# Patient Record
Sex: MALE | URBAN - METROPOLITAN AREA
[De-identification: names, ages, dates, MRNs, and addresses within clinical notes are randomized per-mention and may not be internally consistent; named-entity substitution may affect disease eponyms.]

---

## 2019-09-29 ENCOUNTER — INPATIENT (INPATIENT)
Facility: HOSPITAL | Age: 54
LOS: 2 days | Discharge: HOME | End: 2019-10-02
Attending: HOSPITALIST | Admitting: HOSPITALIST
Payer: SUBSIDIZED

## 2019-09-29 VITALS
RESPIRATION RATE: 18 BRPM | HEART RATE: 120 BPM | TEMPERATURE: 97 F | OXYGEN SATURATION: 99 % | SYSTOLIC BLOOD PRESSURE: 160 MMHG | DIASTOLIC BLOOD PRESSURE: 116 MMHG

## 2019-09-29 DIAGNOSIS — Z98.890 OTHER SPECIFIED POSTPROCEDURAL STATES: Chronic | ICD-10-CM

## 2019-09-29 LAB
ALBUMIN SERPL ELPH-MCNC: 4.1 G/DL — SIGNIFICANT CHANGE UP (ref 3.5–5.2)
ALP SERPL-CCNC: 78 U/L — SIGNIFICANT CHANGE UP (ref 30–115)
ALT FLD-CCNC: 37 U/L — SIGNIFICANT CHANGE UP (ref 0–41)
ANION GAP SERPL CALC-SCNC: 16 MMOL/L — HIGH (ref 7–14)
AST SERPL-CCNC: 25 U/L — SIGNIFICANT CHANGE UP (ref 0–41)
BILIRUB SERPL-MCNC: 0.6 MG/DL — SIGNIFICANT CHANGE UP (ref 0.2–1.2)
BUN SERPL-MCNC: 16 MG/DL — SIGNIFICANT CHANGE UP (ref 10–20)
CALCIUM SERPL-MCNC: 9.5 MG/DL — SIGNIFICANT CHANGE UP (ref 8.5–10.1)
CHLORIDE SERPL-SCNC: 104 MMOL/L — SIGNIFICANT CHANGE UP (ref 98–110)
CK SERPL-CCNC: 127 U/L — SIGNIFICANT CHANGE UP (ref 0–225)
CO2 SERPL-SCNC: 16 MMOL/L — LOW (ref 17–32)
CREAT SERPL-MCNC: 1.1 MG/DL — SIGNIFICANT CHANGE UP (ref 0.7–1.5)
GLUCOSE SERPL-MCNC: 190 MG/DL — HIGH (ref 70–99)
HCT VFR BLD CALC: 47.9 % — SIGNIFICANT CHANGE UP (ref 42–52)
HGB BLD-MCNC: 16.3 G/DL — SIGNIFICANT CHANGE UP (ref 14–18)
MCHC RBC-ENTMCNC: 28.4 PG — SIGNIFICANT CHANGE UP (ref 27–31)
MCHC RBC-ENTMCNC: 34 G/DL — SIGNIFICANT CHANGE UP (ref 32–37)
MCV RBC AUTO: 83.4 FL — SIGNIFICANT CHANGE UP (ref 80–94)
NRBC # BLD: 0 /100 WBCS — SIGNIFICANT CHANGE UP (ref 0–0)
PLATELET # BLD AUTO: 318 K/UL — SIGNIFICANT CHANGE UP (ref 130–400)
POTASSIUM SERPL-MCNC: 4.4 MMOL/L — SIGNIFICANT CHANGE UP (ref 3.5–5)
POTASSIUM SERPL-SCNC: 4.4 MMOL/L — SIGNIFICANT CHANGE UP (ref 3.5–5)
PROT SERPL-MCNC: 7.3 G/DL — SIGNIFICANT CHANGE UP (ref 6–8)
RBC # BLD: 5.74 M/UL — SIGNIFICANT CHANGE UP (ref 4.7–6.1)
RBC # FLD: 13.2 % — SIGNIFICANT CHANGE UP (ref 11.5–14.5)
SODIUM SERPL-SCNC: 136 MMOL/L — SIGNIFICANT CHANGE UP (ref 135–146)
TROPONIN T SERPL-MCNC: <0.01 NG/ML — SIGNIFICANT CHANGE UP
TROPONIN T SERPL-MCNC: <0.01 NG/ML — SIGNIFICANT CHANGE UP
WBC # BLD: 10.37 K/UL — SIGNIFICANT CHANGE UP (ref 4.8–10.8)
WBC # FLD AUTO: 10.37 K/UL — SIGNIFICANT CHANGE UP (ref 4.8–10.8)

## 2019-09-29 PROCEDURE — 93010 ELECTROCARDIOGRAM REPORT: CPT | Mod: 76

## 2019-09-29 PROCEDURE — 71046 X-RAY EXAM CHEST 2 VIEWS: CPT | Mod: 26

## 2019-09-29 PROCEDURE — 99220: CPT

## 2019-09-29 RX ORDER — SODIUM CHLORIDE 9 MG/ML
1000 INJECTION, SOLUTION INTRAVENOUS ONCE
Refills: 0 | Status: COMPLETED | OUTPATIENT
Start: 2019-09-29 | End: 2019-09-29

## 2019-09-29 RX ORDER — AMLODIPINE BESYLATE 2.5 MG/1
5 TABLET ORAL ONCE
Refills: 0 | Status: COMPLETED | OUTPATIENT
Start: 2019-09-29 | End: 2019-09-29

## 2019-09-29 RX ORDER — LISINOPRIL 2.5 MG/1
20 TABLET ORAL ONCE
Refills: 0 | Status: COMPLETED | OUTPATIENT
Start: 2019-09-29 | End: 2019-09-29

## 2019-09-29 RX ORDER — FAMOTIDINE 10 MG/ML
20 INJECTION INTRAVENOUS ONCE
Refills: 0 | Status: COMPLETED | OUTPATIENT
Start: 2019-09-29 | End: 2019-09-29

## 2019-09-29 RX ADMIN — Medication 30 MILLILITER(S): at 09:13

## 2019-09-29 RX ADMIN — LISINOPRIL 20 MILLIGRAM(S): 2.5 TABLET ORAL at 18:55

## 2019-09-29 RX ADMIN — AMLODIPINE BESYLATE 5 MILLIGRAM(S): 2.5 TABLET ORAL at 18:54

## 2019-09-29 RX ADMIN — SODIUM CHLORIDE 1000 MILLILITER(S): 9 INJECTION, SOLUTION INTRAVENOUS at 09:04

## 2019-09-29 RX ADMIN — FAMOTIDINE 20 MILLIGRAM(S): 10 INJECTION INTRAVENOUS at 09:14

## 2019-09-29 RX ADMIN — FAMOTIDINE 104 MILLIGRAM(S): 10 INJECTION INTRAVENOUS at 09:04

## 2019-09-29 NOTE — ED PROVIDER NOTE - PHYSICAL EXAMINATION
Vital Signs: I have reviewed the initial vital signs.  Constitutional: well-nourished, appears stated age, no acute distress  Cardiovascular: tachycardic, regular rhythm, well-perfused extremities  Respiratory: unlabored respiratory effort, clear to auscultation bilaterally  Gastrointestinal: soft, non-tender abdomen, no pulsatile mass  Musculoskeletal: supple neck, no lower extremity edema  Integumentary: warm, dry, no rash  Neurologic: awake, alert, cranial nerves II-XII grossly intact, extremities’ motor and sensory functions grossly intact. No dysmetria/dysdiadochokinesia   Psychiatric: appropriate mood, appropriate affect

## 2019-09-29 NOTE — ED ADULT NURSE NOTE - NSIMPLEMENTINTERV_GEN_ALL_ED
Implemented All Universal Safety Interventions:  [place] to call system. Call bell, personal items and telephone within reach. Instruct patient to call for assistance. Room bathroom lighting operational. Non-slip footwear when patient is off stretcher. Physically safe environment: no spills, clutter or unnecessary equipment. Stretcher in lowest position, wheels locked, appropriate side rails in place.

## 2019-09-29 NOTE — ED ADULT NURSE REASSESSMENT NOTE - NS ED NURSE REASSESS COMMENT FT1
pt. c'o left sided chest pain since yesterday non-radiating. pt. alert and oriented times four. VSS. pt. placed on cardiac monitor NSR. Lab drawn and sent. pt. denies any cp at present time. Comfort and safety measures in place, awaiting eval.

## 2019-09-29 NOTE — ED CDU PROVIDER INITIAL DAY NOTE - PROGRESS NOTE DETAILS
patient lying comfortable will continue to monitor bp elevated , no headache, no dizziness, no blurry vision, states has been non-compliant with medication, spoke to patient pmd dr. Waterman [phone], state patient on lisinipril 20mg and amlodipine 5 mg. will continue to monitor

## 2019-09-29 NOTE — ED CDU PROVIDER INITIAL DAY NOTE - NSCAREINITIATED _GEN_ER
----- Message from Betty Cox PA-C sent at 3/29/2019 11:52 AM CDT -----  Please inform of normal hepatic panel and normal repeat GGT. I don't feel further follow up needed at this time.  
Left message with now normal hepatic panel, per JESSICA Cox PA-C.  Informed her further work up is not needed at this time.  If she has questions, to call us back.  
Jordan Horton(Resident)

## 2019-09-29 NOTE — ED PROVIDER NOTE - NS ED ROS FT
Constitutional: (+) fever (-) vomiting  Eyes/ENT: (-) vision chanages, (-) hearing chanages  Cardiovascular: (+) chest pain, (-) sob  Respiratory: (+) cough, (-) shortness of breath  Gastrointestinal: (-) vomiting, (-) diarrhea, (-) abdominal pain  : (-) dysuria   Musculoskeletal: (-) back pain  Integumentary: (-) rash, (-) edema  Neurological: (-)loc  Allergic/Immunologic: (-) pruritus  Endocrine: No history of thyroid disease

## 2019-09-29 NOTE — ED PROVIDER NOTE - OBJECTIVE STATEMENT
53 y/o M pmh significant for htn, pre-diabetes, hiatal hernia, gout, presenting w/ intermittent burning left parasternal cp. Pt states at 1AM he experienced palpitations that shortly went away, followed by this cp at 2 am. Symptoms go back to Sept. 16th where he began feeling flu-like w/ subjective fevers, fatigue, nausea, and decreased appetite. Pt states at this time he also began experiencing in lisa. feet/hands. Denies vomiting, constipation, diarrhea, loc, recent travel, sick contacts.

## 2019-09-29 NOTE — ED ADULT NURSE REASSESSMENT NOTE - NS ED NURSE REASSESS COMMENT FT1
pt is aaox3, nad, respirations easy and regular, NSR on cardiac monitor, pt is comfortable resting, no complaints offered at this time

## 2019-09-29 NOTE — ED PROVIDER NOTE - PMH
Chronic gout of foot, unspecified cause, unspecified laterality    Essential hypertension    Hiatal hernia    Prediabetes

## 2019-09-29 NOTE — ED PROVIDER NOTE - PROGRESS NOTE DETAILS
ATTENDING NOTE: I personally evaluated the patient. I reviewed the Resident’s note (as assigned above), and agree with the findings and plan except as documented in my note.   53 y/o M with PMH of pre-DM and HTN now p/w CP since 1AM this morning associated with diaphoresis. No vomiting or radiation. Pt states he has not been feeling well since 9/16; has not followed up with his PMD Dr. Waterman. Last cardiac testing done in 2004.   Physical exam: Pt non-toxic, anxious, tachycardic. Pink conjunctiva, anicteric. PERRL, EOMI. MMM. RRR, no murmur. Lungs CTAB. Abdomen soft, NT/ND, no rash. No calf tenderness or edema. No focal neuro deficits.  Plan for labs, imaging, reassess.

## 2019-09-29 NOTE — ED PROVIDER NOTE - NS_EDPROVIDERDISPOUSERTYPE_ED_A_ED
Medical Students Attestation (For Medical Student USE Only)... Medical Students Attestation (For Medical Student USE Only).../Scribe Attestation (For Scribes USE Only)... Scribe Attestation (For Scribes USE Only).../Attending Attestation (For Attendings USE Only).../Medical Students Attestation (For Medical Student USE Only)...

## 2019-09-29 NOTE — ED CDU PROVIDER INITIAL DAY NOTE - ATTENDING CONTRIBUTION TO CARE
Pt reports he has been sick for 2 weeks. + cough, generalized weakness, and feels weak when walking. Yesterday noted chest pain. On exam S1S2 rrr, lungs clear, abdomen is soft nontender, ext neg for edema and tenderness. NO rash. -tob. Placed into observation for ACS evaluation. Will CK for body. Pt reports he has been sick for 2 weeks. + cough, generalized weakness, and feels weak when walking. Yesterday noted chest pain. On exam S1S2 rrr, lungs clear, abdomen is soft nontender, ext neg for edema and tenderness. NO rash. -tob. Placed into observation for ACS evaluation. Will CK for body..

## 2019-09-29 NOTE — ED PROVIDER NOTE - ALCOHOL USE HISTORY SINGLE SELECT
S: patient reports he has dribbling of urine, burning urination, pain on the suprapubic area/penis, increased frequency of urination. He denies any fever, chills, flank pain, nausea, vomiting, lightheadedness or dizziness  He is worried about stone.     Per chart review he has hx of urethral stricture s/p s/p dorsal onlay with recurrence s/p buccal graft ventral onlay urethroplasty in 2016    Objective: Vital signs:    Blood pressure 117/74, pulse 86, temperature 97.7  F (36.5  C), temperature source Oral, resp. rate 16, height 1.829 m (6'), weight 108.2 kg (238 lb 8 oz), SpO2 96 %.  Estimated body mass index is 32.35 kg/m  as calculated from the following:    Height as of this encounter: 1.829 m (6').    Weight as of this encounter: 108.2 kg (238 lb 8 oz).      Alert, awake,and oriented  S1, S2 normal  B/L CTA  Soft, NT  In no acute distress    A/P     Lower urinary tract symptoms  Hx of Urethral Stricture s/p Urethroplasty  He was able to urinate during my visit  He is no obvious distress.   I will order UA, and Renal US for evaluation  Follow up with Urology in AM   Sign out to medical team tomorrow     Addendum: UA with pyuria, no urgent indication to start Abx. Wait for culture    Ector Stephens  Internal Medicine  Hospitalist  Pager no: [phone]    yes...

## 2019-09-30 PROCEDURE — 99222 1ST HOSP IP/OBS MODERATE 55: CPT

## 2019-09-30 PROCEDURE — 99217: CPT

## 2019-09-30 PROCEDURE — 75574 CT ANGIO HRT W/3D IMAGE: CPT | Mod: 26

## 2019-09-30 RX ORDER — METOPROLOL TARTRATE 50 MG
50 TABLET ORAL
Refills: 0 | Status: DISCONTINUED | OUTPATIENT
Start: 2019-09-30 | End: 2019-10-02

## 2019-09-30 RX ORDER — ATORVASTATIN CALCIUM 80 MG/1
80 TABLET, FILM COATED ORAL AT BEDTIME
Refills: 0 | Status: DISCONTINUED | OUTPATIENT
Start: 2019-09-30 | End: 2019-10-02

## 2019-09-30 RX ORDER — AMLODIPINE BESYLATE 2.5 MG/1
5 TABLET ORAL DAILY
Refills: 0 | Status: DISCONTINUED | OUTPATIENT
Start: 2019-09-30 | End: 2019-10-01

## 2019-09-30 RX ORDER — HEPARIN SODIUM 5000 [USP'U]/ML
5000 INJECTION INTRAVENOUS; SUBCUTANEOUS EVERY 8 HOURS
Refills: 0 | Status: DISCONTINUED | OUTPATIENT
Start: 2019-09-30 | End: 2019-10-02

## 2019-09-30 RX ORDER — METOPROLOL TARTRATE 50 MG
100 TABLET ORAL ONCE
Refills: 0 | Status: COMPLETED | OUTPATIENT
Start: 2019-09-30 | End: 2019-09-30

## 2019-09-30 RX ORDER — CLOPIDOGREL BISULFATE 75 MG/1
300 TABLET, FILM COATED ORAL ONCE
Refills: 0 | Status: COMPLETED | OUTPATIENT
Start: 2019-09-30 | End: 2019-09-30

## 2019-09-30 RX ORDER — METOPROLOL TARTRATE 50 MG
50 TABLET ORAL ONCE
Refills: 0 | Status: COMPLETED | OUTPATIENT
Start: 2019-09-30 | End: 2019-09-30

## 2019-09-30 RX ORDER — METOPROLOL TARTRATE 50 MG
5 TABLET ORAL ONCE
Refills: 0 | Status: COMPLETED | OUTPATIENT
Start: 2019-09-30 | End: 2019-09-30

## 2019-09-30 RX ORDER — NITROGLYCERIN 6.5 MG
0.4 CAPSULE, EXTENDED RELEASE ORAL
Refills: 0 | Status: DISCONTINUED | OUTPATIENT
Start: 2019-09-30 | End: 2019-10-02

## 2019-09-30 RX ORDER — ALLOPURINOL 300 MG
100 TABLET ORAL DAILY
Refills: 0 | Status: DISCONTINUED | OUTPATIENT
Start: 2019-09-30 | End: 2019-10-02

## 2019-09-30 RX ORDER — INFLUENZA VIRUS VACCINE 15; 15; 15; 15 UG/.5ML; UG/.5ML; UG/.5ML; UG/.5ML
0.5 SUSPENSION INTRAMUSCULAR ONCE
Refills: 0 | Status: DISCONTINUED | OUTPATIENT
Start: 2019-09-30 | End: 2019-10-02

## 2019-09-30 RX ORDER — ASPIRIN/CALCIUM CARB/MAGNESIUM 324 MG
81 TABLET ORAL DAILY
Refills: 0 | Status: DISCONTINUED | OUTPATIENT
Start: 2019-09-30 | End: 2019-10-02

## 2019-09-30 RX ORDER — ENOXAPARIN SODIUM 100 MG/ML
114 INJECTION SUBCUTANEOUS ONCE
Refills: 0 | Status: COMPLETED | OUTPATIENT
Start: 2019-09-30 | End: 2019-09-30

## 2019-09-30 RX ORDER — ALLOPURINOL 300 MG
100 TABLET ORAL
Refills: 0 | Status: DISCONTINUED | OUTPATIENT
Start: 2019-09-30 | End: 2019-09-30

## 2019-09-30 RX ORDER — PANTOPRAZOLE SODIUM 20 MG/1
40 TABLET, DELAYED RELEASE ORAL
Refills: 0 | Status: DISCONTINUED | OUTPATIENT
Start: 2019-09-30 | End: 2019-10-02

## 2019-09-30 RX ORDER — SODIUM CHLORIDE 9 MG/ML
1000 INJECTION, SOLUTION INTRAVENOUS
Refills: 0 | Status: DISCONTINUED | OUTPATIENT
Start: 2019-09-30 | End: 2019-10-02

## 2019-09-30 RX ADMIN — SODIUM CHLORIDE 75 MILLILITER(S): 9 INJECTION, SOLUTION INTRAVENOUS at 20:58

## 2019-09-30 RX ADMIN — Medication 50 MILLIGRAM(S): at 09:24

## 2019-09-30 RX ADMIN — ATORVASTATIN CALCIUM 80 MILLIGRAM(S): 80 TABLET, FILM COATED ORAL at 22:56

## 2019-09-30 RX ADMIN — Medication 81 MILLIGRAM(S): at 15:45

## 2019-09-30 RX ADMIN — Medication 5 MILLIGRAM(S): at 15:46

## 2019-09-30 RX ADMIN — Medication 100 MILLIGRAM(S): at 08:55

## 2019-09-30 RX ADMIN — Medication 100 MILLIGRAM(S): at 16:14

## 2019-09-30 RX ADMIN — AMLODIPINE BESYLATE 5 MILLIGRAM(S): 2.5 TABLET ORAL at 16:14

## 2019-09-30 RX ADMIN — CLOPIDOGREL BISULFATE 300 MILLIGRAM(S): 75 TABLET, FILM COATED ORAL at 15:45

## 2019-09-30 RX ADMIN — Medication 5 MILLIGRAM(S): at 11:31

## 2019-09-30 RX ADMIN — ENOXAPARIN SODIUM 114 MILLIGRAM(S): 100 INJECTION SUBCUTANEOUS at 22:56

## 2019-09-30 RX ADMIN — Medication 50 MILLIGRAM(S): at 17:13

## 2019-09-30 NOTE — H&P ADULT - NSHPSOCIALHISTORY_GEN_ALL_CORE
never smoker, patient consumes alcohol on occasions (10 drinks / year on average), remote history of Marijuana use in his childhood, no current illicit substance use   Patient is currently employed and lives with his girlfriend

## 2019-09-30 NOTE — CONSULT NOTE ADULT - ASSESSMENT
54 y/m with PMH Of HTN, Prediabetes, HIatal hernia, Gout presented after he was having intermittent chest pain.     # Chest pain likely d/t Coronary artery disease:    EKG No ischemia, Troponin x 2 : -ve  CT Coronaries: Severe stenosis involving proximal LAD, Total coronary calcium score is 151  C/w Aspirin and Metoprolol, would add high intensity statin  Would send Hba1c and lipid profile  Will likely go for Cardiac cath in AM    WILL DISCUSS WITH ATTENDING 54 y/m with PMH Of HTN, Prediabetes, HIatal hernia, Gout presented after he was having intermittent chest pain.     # Chest pain likely d/t Coronary artery disease:    EKG No ischemia, Troponin x 2 : -ve  CT Coronaries: Severe stenosis involving proximal LAD, Total coronary calcium score is 151  C/w Aspirin and Metoprolol, would add high intensity statin, load with plavix, would give lovenox one dose 1mg/kg  Would send Hba1c and lipid profile  Planned for cath in AM, NPO after midnight, Normal saline at 100 cc/hr after midnight. 54 y/m with PMHx of HTN, Prediabetes, Hiatal hernia, Gout presented after he was having intermittent chest pain.     # Chest pain likely d/t Coronary artery disease:    EKG No ischemia, Troponin x 2 : -ve  CT Coronaries: Severe stenosis involving proximal LAD, Total coronary calcium score is 151  C/w Aspirin and Metoprolol, would add high intensity statin, load with plavix, would give lovenox one dose 1mg/kg  Would send Hba1c and lipid profile  Planned for cath in AM, NPO after midnight, Normal saline at 100 cc/hr after midnight.

## 2019-09-30 NOTE — H&P ADULT - NSICDXFAMILYHX_GEN_ALL_CORE_FT
FAMILY HISTORY:  FH: colon cancer, father    Grandparent  Still living? Unknown  Family history of acute myocardial infarction, Age at diagnosis: Age Unknown

## 2019-09-30 NOTE — H&P ADULT - NSHPPHYSICALEXAM_GEN_ALL_CORE
GENERAL: NAD, lying in bed comfortably, anxious about his condition   HEAD:  Atraumatic, Normocephalic  EYES: conjunctiva and sclera clear  ENT: Moist mucous membranes  NECK: Supple, No JVD  CHEST/LUNG: Clear to auscultation bilaterally; No rales, rhonchi, wheezing, or rubs. Unlabored respirations  HEART: Regular rate and rhythm; No murmurs  ABDOMEN: Bowel sounds present; Soft, Nontender, Nondistended.   EXTREMITIES:  + Peripheral Pulses, no edema  NERVOUS SYSTEM:  Alert & Oriented X3, speech clear. No deficits

## 2019-09-30 NOTE — ED CDU PROVIDER DISPOSITION NOTE - ATTENDING CONTRIBUTION TO CARE
patient evaluated for chest pain, found to have abnormal ccta with proximal lad lesion, patient has been symptom free here. ekgs and enzymes negative, admitted for further work up and cardiology evaluation.

## 2019-09-30 NOTE — H&P ADULT - NSHPREVIEWOFSYSTEMS_GEN_ALL_CORE
CONSTITUTIONAL: + Weakness, + subjective fevers, + loss of appetite, unsure if unintentional weight loss   EYES/ENT: No visual changes  RESPIRATORY: + cough with minimal white sputum production, + dyspnea, + snoring   CARDIOVASCULAR: + chest pain, + palpitations   GASTROINTESTINAL: No abdominal pain, no vomiting, no changes in bowel movements   GENITOURINARY: No urine changes, no dysuria   NEUROLOGICAL: No headache, + numbness of the extremities

## 2019-09-30 NOTE — CONSULT NOTE ADULT - SUBJECTIVE AND OBJECTIVE BOX
· HPI Objective Statement: 55 y/o M pmh significant for htn, pre-diabetes, hiatal hernia, gout, presenting w/ intermittent burning left parasternal cp. Pt states at 1AM he experienced palpitations that shortly went away, followed by this cp at 2 am. Symptoms go back to Sept. 16th where he began feeling flu-like w/ subjective fevers, fatigue, nausea, and decreased appetite. Pt states at this time he also began experiencing in lisa. feet/hands. Denies vomiting, constipation, diarrhea, loc, recent travel, sick contacts.	        HPI:      ROS:  All other systems reviewed and are negative    PAST MEDICAL & SURGICAL HISTORY  Prediabetes  Chronic gout of foot, unspecified cause, unspecified laterality  Essential hypertension  Hiatal hernia  History of tonsillectomy and adenoidectomy      FAMILY HISTORY:  FAMILY HISTORY:  Family history of acute myocardial infarction (Grandparent)      SOCIAL HISTORY:  []smoker  []Alcohol  []Drug    ALLERGIES:  No Known Allergies      MEDICATIONS:  MEDICATIONS  (STANDING):  aspirin  chewable 81 milliGRAM(s) Oral daily  metoprolol tartrate Injectable 5 milliGRAM(s) IV Push once    MEDICATIONS  (PRN):      HOME MEDICATIONS:  Home Medications:      VITALS:   T(F): 97.8 (09-30 @ 11:22), Max: 98.4 (09-29 @ 17:01)  HR: 61 (09-30 @ 11:22) (61 - 120)  BP: 133/88 (09-30 @ 11:22) (133/88 - 180/118)  BP(mean): --  RR: 18 (09-30 @ 11:22) (18 - 18)  SpO2: 96% (09-30 @ 11:22) (96% - 99%)    I&O's Summary      PHYSICAL EXAM:  GEN: Alert and oriented X 3, Well nourished, No acute distress  NECK: Supple, no JVD  LUNGS: Clear to auscultation bilaterally  CARDIOVASCULAR: S1/S2 regular , no murmus or rubs  ABD: Soft, non-tender  EXT: No Lower extremity edema/cyanosis  NEURO: Non focal  SKIN: Intact    LABS:                        16.3   10.37 )-----------( 318      ( 29 Sep 2019 08:10 )             47.9     09-29    136  |  104  |  16  ----------------------------<  190<H>  4.4   |  16<L>  |  1.1    Ca    9.5      29 Sep 2019 08:10    TPro  7.3  /  Alb  4.1  /  TBili  0.6  /  DBili  x   /  AST  25  /  ALT  37  /  AlkPhos  78  09-29        CARDIAC MARKERS ( 29 Sep 2019 12:30 )  x     / <0.01 ng/mL / 127 U/L / x     / x      CARDIAC MARKERS ( 29 Sep 2019 08:10 )  x     / <0.01 ng/mL / x     / x     / x            Troponin trend:        Hemoglobin A1C   Thyroid      RADIOLOGY:  -CXR:  -TTE:  -CCTA:  -STRESS TEST:  -CATHETERIZATION:    ECG:    TELEMETRY EVENTS: 54 y/m with PMH Of HTN, Prediabetes, HIatal hernia, Gout presented after he was having intermittent chest pain. He started having this left sided chest pain on Saturday morning, which woke him from his sleep, was burnining, radiating to left arm, lasted for around 15 min, not related to exertion/rest, was very severe. He had few other episodes but less severe since then. He mentions having very mild chest pain in the past rarely, not related to exertion, but less severe and different than current chest pain. His current chest pain was also associated with shortness of breath and palpitation. He also mentions having "flu" for last week, has dry cough but no fever, denies any belly pain, change in bladder and bowel habits.      ROS:  All other systems reviewed and are negative    PAST MEDICAL & SURGICAL HISTORY  Prediabetes  Chronic gout of foot, unspecified cause, unspecified laterality  Essential hypertension  Hiatal hernia  History of tonsillectomy and adenoidectomy      FAMILY HISTORY:  FAMILY HISTORY:  Family history of acute myocardial infarction (Grandparent)      SOCIAL HISTORY:  []smoker  []Alcohol  []Drug    ALLERGIES:  No Known Allergies    Home Medications:        MEDICATIONS:  MEDICATIONS  (STANDING):  aspirin  chewable 81 milliGRAM(s) Oral daily  metoprolol tartrate Injectable 5 milliGRAM(s) IV Push once    MEDICATIONS  (PRN):      HOME MEDICATIONS:  Home Medications:      VITALS:   T(F): 97.8 (09-30 @ 11:22), Max: 98.4 (09-29 @ 17:01)  HR: 61 (09-30 @ 11:22) (61 - 120)  BP: 133/88 (09-30 @ 11:22) (133/88 - 180/118)  BP(mean): --  RR: 18 (09-30 @ 11:22) (18 - 18)  SpO2: 96% (09-30 @ 11:22) (96% - 99%)    I&O's Summary      PHYSICAL EXAM:  GEN: Alert and oriented X 3, Well nourished, No acute distress  NECK: Supple, no JVD  LUNGS: Clear to auscultation bilaterally  CARDIOVASCULAR: S1/S2 regular , no murmus or rubs  ABD: Soft, non-tender  EXT: No Lower extremity edema/cyanosis  NEURO: Non focal  SKIN: Intact    LABS:                        16.3   10.37 )-----------( 318      ( 29 Sep 2019 08:10 )             47.9     09-29    136  |  104  |  16  ----------------------------<  190<H>  4.4   |  16<L>  |  1.1    Ca    9.5      29 Sep 2019 08:10    TPro  7.3  /  Alb  4.1  /  TBili  0.6  /  DBili  x   /  AST  25  /  ALT  37  /  AlkPhos  78  09-29        CARDIAC MARKERS ( 29 Sep 2019 12:30 )  x     / <0.01 ng/mL / 127 U/L / x     / x      CARDIAC MARKERS ( 29 Sep 2019 08:10 )  x     / <0.01 ng/mL / x     / x     / x            Troponin trend:        Hemoglobin A1C   Thyroid      RADIOLOGY:  -CXR: CLear  -TTE:  -CCTA:  < from: CT Heart with Coronaries (09.30.19 @ 10:57) >    1.  Severe stenosis involving proximal LAD, related to mixed   atherosclerotic plaques.  2.  Total coronary calcium score is 151.  For a 54 years old male , this   corresponds to 86 SMALL percentile rank.       < end of copied text >    -STRESS TEST:  -CATHETERIZATION:    ECG:  Normal sinus rhythm, no ischemia    TELEMETRY EVENTS: CC: Chest pain    54 y/m with PMH Of HTN, Prediabetes, HIatal hernia, Gout presented after he was having intermittent chest pain. He started having this left sided chest pain on Saturday morning, which woke him from his sleep, was burnining, radiating to left arm, lasted for around 15 min, not related to exertion/rest, was very severe. He had few other episodes but less severe since then. He mentions having very mild chest pain in the past rarely, not related to exertion, but less severe and different than current chest pain. His current chest pain was also associated with shortness of breath and palpitation. He also mentions having "flu" for last week, has dry cough but no fever, denies any belly pain, change in bladder and bowel habits.      PAST MEDICAL & SURGICAL HISTORY  Prediabetes  Chronic gout of foot, unspecified cause, unspecified laterality  Essential hypertension  Hiatal hernia  History of tonsillectomy and adenoidectomy      FAMILY HISTORY:  FAMILY HISTORY:  Family history of acute myocardial infarction (Grandparent)      SOCIAL HISTORY:  [-]smoker  [-]Alcohol  []Drug    ALLERGIES:  No Known Allergies      Home Medications:  allopurinol 100 mg oral tablet: 1 tab(s) orally once a day (30 Sep 2019 14:43)  Norvasc 5 mg oral tablet: 1 tab(s) orally once a day (30 Sep 2019 14:43)    MEDICATIONS  (STANDING):  aspirin  chewable 81 milliGRAM(s) Oral daily  metoprolol tartrate Injectable 5 milliGRAM(s) IV Push once      REVIEW OF SYSTEMS:  CONSTITUTIONAL: No fever, weight loss, +fatigue  NECK: No pain or stiffness  RESPIRATORY: No cough, wheezing, shortness of breath  CARDIOVASCULAR: See HPI  GASTROINTESTINAL: No abdominal/epigastric pain, nausea, vomiting, hematemesis, diarrhea, constipation, melena or hematochezia  GENITOURINARY: No dysuria, frequency, hematuria, incontinence  NEUROLOGICAL: No headaches, memory loss, loss of strength, numbness, tremors  SKIN: No itching, burning, rashes, lesions   ENDOCRINE: No heat/cold intolerance or hair loss  MUSCULOSKELETAL: No joint pain or swelling  HEME/LYMPH: No easy bruising or bleeding gums      VITALS:   T(F): 97.8 (09-30 @ 11:22), Max: 98.4 (09-29 @ 17:01)  HR: 61 (09-30 @ 11:22) (61 - 120)  BP: 133/88 (09-30 @ 11:22) (133/88 - 180/118)  BP(mean): --  RR: 18 (09-30 @ 11:22) (18 - 18)  SpO2: 96% (09-30 @ 11:22) (96% - 99%)    PHYSICAL EXAM:  General: NAD, AAOx3  HEENT: NCAT, EOMI  Neck: supple, no JVD  CV: RRR, S1S2 nl, no murmurs, no edema  Respiratory: CTA bilaterally, no wheeze, rales or rhonchi	  Abdomen: soft, NT/ND, +BS  Extremities: ROM nl, 2+ PP bilaterally  Neuro: Nonfocal      LABS:                        16.3   10.37 )-----------( 318      ( 29 Sep 2019 08:10 )             47.9     09-29    136  |  104  |  16  ----------------------------<  190<H>  4.4   |  16<L>  |  1.1    Ca    9.5      29 Sep 2019 08:10    TPro  7.3  /  Alb  4.1  /  TBili  0.6  /  DBili  x   /  AST  25  /  ALT  37  /  AlkPhos  78  09-29        CARDIAC MARKERS ( 29 Sep 2019 12:30 )  x     / <0.01 ng/mL / 127 U/L / x     / x      CARDIAC MARKERS ( 29 Sep 2019 08:10 )  x     / <0.01 ng/mL / x     / x     / x            < from: CT Heart with Coronaries (09.30.19 @ 10:57) >    1.  Severe stenosis involving proximal LAD, related to mixed   atherosclerotic plaques.  2.  Total coronary calcium score is 151.  For a 54 years old male , this   corresponds to 86 SMALL percentile rank.       < end of copied text >      ECG:  Normal sinus rhythm, no ischemia

## 2019-09-30 NOTE — H&P ADULT - ASSESSMENT
54 year old male patient with HTN, pre-diabetes, hiatal hernia, Gout presented for chest pain.     # Recurrent chest pain, unstable angina secondary to severe proximal LAD stenosis on CCTA   - Cardiology consult placed for possible cath   - Continue Aspirin 81 mg, will start on Lipitor 80 mg daily and Metoprolol tartrate 50 mg BID   - Check A1C and lipid panel   - Continue Telemetry monitoring   - NPO after midnight for now for possible cath tomorrow pending cardiology recommendations   - Troponin and ECG STAT if chest pain recurs  - Nitroglycerin PRN for chest pain   - Will check A1C and lipid levels     # HTN   - Continue Amlodipine 5 mg daily, history of non-compliance     # Suspected sleep apnea   - Patient states he snores a lot and reports that he sleeps more than 12 hours / day and doesn't feel refreshed   - outpatient sleep studies and pulmonary referral     # Hiatal hernia   - PPI PRN if + symptoms     # History of gout   - Continue Allopurinol 100 mg daily     # Miscellaneous   - Heparin for DVT prophylaxis   - Protonix for GI prophylaxis   - DASH TLC diet  - Ambulate as tolerated   - Full code  - Disposition : Acute 54 year old male patient with HTN, pre-diabetes, hiatal hernia, Gout presented for chest pain.     # Recurrent chest pain, unstable angina secondary to severe proximal LAD stenosis on CCTA   - Cardiology consult placed for possible cath   - Continue Aspirin 81 mg, will start on Lipitor 80 mg daily and Metoprolol tartrate 50 mg BID   - Check A1C and lipid panel   - Continue Telemetry monitoring   - NPO after midnight for now for possible cath tomorrow pending cardiology recommendations   - Troponin and ECG STAT if chest pain recurs  - Nitroglycerin PRN for chest pain     # HTN   - Continue Amlodipine 5 mg daily, history of non-compliance     # Suspected sleep apnea   - Patient states he snores a lot and reports that he sleeps more than 12 hours / day and doesn't feel refreshed   - outpatient sleep studies and pulmonary referral     # Hiatal hernia   - Protonix 40 mg daily     # History of gout   - Continue Allopurinol 100 mg daily     # pre-diabetes   - F/U A1C level   - Lifestyle and diet counselling on discharge     # Miscellaneous   - Heparin for DVT prophylaxis   - Protonix for GI prophylaxis   - DASH TLC diet  - Ambulate as tolerated   - Full code  - Disposition : Acute 54 year old male patient with HTN, pre-diabetes, hiatal hernia, Gout presented for chest pain.     # Recurrent chest pain, unstable angina secondary to severe proximal LAD stenosis on CCTA   - Cardiology consult placed for possible cath   - Continue Aspirin 81 mg, will start on Lipitor 80 mg daily and Metoprolol tartrate 50 mg BID (Hold for HR < 60)   - Check A1C and lipid panel   - Continue Telemetry monitoring   - NPO after midnight for now for possible cath tomorrow pending cardiology recommendations   - Troponin and ECG STAT if chest pain recurs  - Nitroglycerin PRN for chest pain   - Discussed with Dr. Mendiola : Will give Plavix 300 mg once and Lovenox 1  mg/kg once and continue IVF     # HTN   - Continue Amlodipine 5 mg daily, history of non-compliance     # Suspected sleep apnea   - Patient states he snores a lot and reports that he sleeps more than 12 hours / day and doesn't feel refreshed   - outpatient sleep studies and pulmonary referral     # Hiatal hernia   - Protonix 40 mg daily     # History of gout   - Continue Allopurinol 100 mg daily     # pre-diabetes   - F/U A1C level   - Lifestyle and diet counselling on discharge     # Miscellaneous   - Heparin for DVT prophylaxis   - Protonix for GI prophylaxis   - DASH TLC diet  - Ambulate as tolerated   - Full code  - Disposition : Acute

## 2019-09-30 NOTE — ED CDU PROVIDER DISPOSITION NOTE - CLINICAL COURSE
evaluated for chest pain, CCTA showed proximal lAD stenosis, cardiology consulted, will evaluate patient, currently chest pain free since last night. admitted for further work up

## 2019-09-30 NOTE — H&P ADULT - HISTORY OF PRESENT ILLNESS
54 year old male patient with HTN, pre-diabetes, hiatal hernia, Gout presented for chest pain.   The patient states that his chest pain started Saturday at 1 AM, left sided described as " needles on the chest" radiating to the left subaxillary region and left arm, associated with palpitations lasted 15 minutes, for which he took Aspirin and rubbed his chest with some improvement. The pain recurred again at 3 AM and lasted 5 minutes and then at 6 AM and lasted a shorter period of time then during the day the patient was walking to a bagel store and felt some weakness of the lower extremities and numbness of the fingers and noticed that his gait was wobbly so he decided to present to the ED.   The patient reports that on the 17th after coming back from work he felt flushing associated with nausea and cough and excessive sweats, he had some chest pain at the time but not as intense as the current episode, since then he has been having loss of appetite and weakness. The patient denies any history of angina and states that he used to get some minimal chest pain described as pinching sensation unrelated to activity but it never was as intense as the current episode.   He is active at his work, denies smoking, and there is CAD in the grandmother. The patient states that he had a stress test done in 2012 or 2013 and was normal.   In the ED troponin negative, ECG negative for ischemic changes and the patient was admitted for observation. Subsequently a CCTA was done and was positive for severe stenosis of the proximal LAD and the patient was admitted for possible cardiac cath.

## 2019-09-30 NOTE — ED ADULT NURSE REASSESSMENT NOTE - NS ED NURSE REASSESS COMMENT FT1
Pt assessed A/O times 4 vs stable placed on cardiac monitor denies no chest pain no SOB no N/V no dizziness ,ambulate steady ,comfort provide on the bed ,pt is seen evaluate by Ed attending  with order for CTa waiting for CT ,on going nursing observation .

## 2019-09-30 NOTE — CONSULT NOTE ADULT - ATTENDING COMMENTS
53 yo M with h/o HTN, Prediabetes p/w typical angina. Found to have severe pLAD stenosis on CCTA.    EKG: LVH    Recommend:  - NPO for [facility] tomorrow - Dr. Ben Mendiola  - ASA / Plavix 300 mg  - Single dose therapeutic Lovenox today  - Continue Lipitor 80 mg  - Continue Metoprolol 50 mg PO q12  - IVF

## 2019-09-30 NOTE — ED CDU PROVIDER SUBSEQUENT DAY NOTE - HISTORY
Pt placed in OBS for chest pain. Pt currently resting comfortably and denies any complaints. Will continue to monitor.

## 2019-09-30 NOTE — H&P ADULT - NSICDXPASTMEDICALHX_GEN_ALL_CORE_FT
PAST MEDICAL HISTORY:  Chronic gout of foot, unspecified cause, unspecified laterality     Essential hypertension     Hiatal hernia     Prediabetes

## 2019-09-30 NOTE — ED CDU PROVIDER SUBSEQUENT DAY NOTE - MEDICAL DECISION MAKING DETAILS
patient evaluated by ccta, finding proximal lesion in LAD. discussed with cardiology given lesion, tigre dmit to tele. aspirin loaded, will continue aspirin now. cardiology will decide and plavix.

## 2019-10-01 LAB
ALBUMIN SERPL ELPH-MCNC: 4.1 G/DL — SIGNIFICANT CHANGE UP (ref 3.5–5.2)
ALP SERPL-CCNC: 73 U/L — SIGNIFICANT CHANGE UP (ref 30–115)
ALT FLD-CCNC: 29 U/L — SIGNIFICANT CHANGE UP (ref 0–41)
ANION GAP SERPL CALC-SCNC: 14 MMOL/L — SIGNIFICANT CHANGE UP (ref 7–14)
APTT BLD: 45.3 SEC — HIGH (ref 27–39.2)
AST SERPL-CCNC: 16 U/L — SIGNIFICANT CHANGE UP (ref 0–41)
BASOPHILS # BLD AUTO: 0.06 K/UL — SIGNIFICANT CHANGE UP (ref 0–0.2)
BASOPHILS NFR BLD AUTO: 0.6 % — SIGNIFICANT CHANGE UP (ref 0–1)
BILIRUB SERPL-MCNC: 0.4 MG/DL — SIGNIFICANT CHANGE UP (ref 0.2–1.2)
BLD GP AB SCN SERPL QL: SIGNIFICANT CHANGE UP
BUN SERPL-MCNC: 15 MG/DL — SIGNIFICANT CHANGE UP (ref 10–20)
CALCIUM SERPL-MCNC: 9.4 MG/DL — SIGNIFICANT CHANGE UP (ref 8.5–10.1)
CHLORIDE SERPL-SCNC: 103 MMOL/L — SIGNIFICANT CHANGE UP (ref 98–110)
CHOLEST SERPL-MCNC: 168 MG/DL — SIGNIFICANT CHANGE UP (ref 100–200)
CO2 SERPL-SCNC: 22 MMOL/L — SIGNIFICANT CHANGE UP (ref 17–32)
CREAT SERPL-MCNC: 1 MG/DL — SIGNIFICANT CHANGE UP (ref 0.7–1.5)
EOSINOPHIL # BLD AUTO: 0.29 K/UL — SIGNIFICANT CHANGE UP (ref 0–0.7)
EOSINOPHIL NFR BLD AUTO: 2.8 % — SIGNIFICANT CHANGE UP (ref 0–8)
ESTIMATED AVERAGE GLUCOSE: 114 MG/DL — SIGNIFICANT CHANGE UP (ref 68–114)
GLUCOSE SERPL-MCNC: 104 MG/DL — HIGH (ref 70–99)
HBA1C BLD-MCNC: 5.6 % — SIGNIFICANT CHANGE UP (ref 4–5.6)
HCT VFR BLD CALC: 47.3 % — SIGNIFICANT CHANGE UP (ref 42–52)
HCV AB S/CO SERPL IA: 0.16 S/CO — SIGNIFICANT CHANGE UP (ref 0–0.99)
HCV AB SERPL-IMP: SIGNIFICANT CHANGE UP
HDLC SERPL-MCNC: 31 MG/DL — LOW
HGB BLD-MCNC: 16.1 G/DL — SIGNIFICANT CHANGE UP (ref 14–18)
IMM GRANULOCYTES NFR BLD AUTO: 0.3 % — SIGNIFICANT CHANGE UP (ref 0.1–0.3)
INR BLD: 1.08 RATIO — SIGNIFICANT CHANGE UP (ref 0.65–1.3)
LIPID PNL WITH DIRECT LDL SERPL: 113 MG/DL — SIGNIFICANT CHANGE UP (ref 4–129)
LYMPHOCYTES # BLD AUTO: 29.5 % — SIGNIFICANT CHANGE UP (ref 20.5–51.1)
LYMPHOCYTES # BLD AUTO: 3.03 K/UL — SIGNIFICANT CHANGE UP (ref 1.2–3.4)
MAGNESIUM SERPL-MCNC: 2.2 MG/DL — SIGNIFICANT CHANGE UP (ref 1.8–2.4)
MCHC RBC-ENTMCNC: 28.3 PG — SIGNIFICANT CHANGE UP (ref 27–31)
MCHC RBC-ENTMCNC: 34 G/DL — SIGNIFICANT CHANGE UP (ref 32–37)
MCV RBC AUTO: 83.1 FL — SIGNIFICANT CHANGE UP (ref 80–94)
MONOCYTES # BLD AUTO: 0.69 K/UL — HIGH (ref 0.1–0.6)
MONOCYTES NFR BLD AUTO: 6.7 % — SIGNIFICANT CHANGE UP (ref 1.7–9.3)
NEUTROPHILS # BLD AUTO: 6.18 K/UL — SIGNIFICANT CHANGE UP (ref 1.4–6.5)
NEUTROPHILS NFR BLD AUTO: 60.1 % — SIGNIFICANT CHANGE UP (ref 42.2–75.2)
NRBC # BLD: 0 /100 WBCS — SIGNIFICANT CHANGE UP (ref 0–0)
PLATELET # BLD AUTO: 273 K/UL — SIGNIFICANT CHANGE UP (ref 130–400)
POTASSIUM SERPL-MCNC: 4.6 MMOL/L — SIGNIFICANT CHANGE UP (ref 3.5–5)
POTASSIUM SERPL-SCNC: 4.6 MMOL/L — SIGNIFICANT CHANGE UP (ref 3.5–5)
PROT SERPL-MCNC: 6.7 G/DL — SIGNIFICANT CHANGE UP (ref 6–8)
PROTHROM AB SERPL-ACNC: 12.4 SEC — SIGNIFICANT CHANGE UP (ref 9.95–12.87)
RBC # BLD: 5.69 M/UL — SIGNIFICANT CHANGE UP (ref 4.7–6.1)
RBC # FLD: 13.4 % — SIGNIFICANT CHANGE UP (ref 11.5–14.5)
SODIUM SERPL-SCNC: 139 MMOL/L — SIGNIFICANT CHANGE UP (ref 135–146)
TOTAL CHOLESTEROL/HDL RATIO MEASUREMENT: 5.4 RATIO — SIGNIFICANT CHANGE UP (ref 4–5.5)
TRIGL SERPL-MCNC: 271 MG/DL — HIGH (ref 10–149)
TROPONIN T SERPL-MCNC: <0.01 NG/ML — SIGNIFICANT CHANGE UP
WBC # BLD: 10.28 K/UL — SIGNIFICANT CHANGE UP (ref 4.8–10.8)
WBC # FLD AUTO: 10.28 K/UL — SIGNIFICANT CHANGE UP (ref 4.8–10.8)

## 2019-10-01 PROCEDURE — 99232 SBSQ HOSP IP/OBS MODERATE 35: CPT | Mod: 57

## 2019-10-01 PROCEDURE — 92928 PRQ TCAT PLMT NTRAC ST 1 LES: CPT | Mod: LD

## 2019-10-01 PROCEDURE — 93458 L HRT ARTERY/VENTRICLE ANGIO: CPT | Mod: 26,XU

## 2019-10-01 PROCEDURE — 93010 ELECTROCARDIOGRAM REPORT: CPT

## 2019-10-01 PROCEDURE — 92929: CPT | Mod: LD

## 2019-10-01 RX ORDER — HYDRALAZINE HCL 50 MG
10 TABLET ORAL ONCE
Refills: 0 | Status: COMPLETED | OUTPATIENT
Start: 2019-10-01 | End: 2019-10-01

## 2019-10-01 RX ORDER — METOPROLOL TARTRATE 50 MG
1 TABLET ORAL
Qty: 60 | Refills: 0
Start: 2019-10-01 | End: 2019-10-30

## 2019-10-01 RX ORDER — CLOPIDOGREL BISULFATE 75 MG/1
1 TABLET, FILM COATED ORAL
Qty: 30 | Refills: 0
Start: 2019-10-01 | End: 2019-10-30

## 2019-10-01 RX ORDER — NITROGLYCERIN 6.5 MG
0.4 CAPSULE, EXTENDED RELEASE ORAL ONCE
Refills: 0 | Status: COMPLETED | OUTPATIENT
Start: 2019-10-01 | End: 2019-10-01

## 2019-10-01 RX ORDER — CLOPIDOGREL BISULFATE 75 MG/1
75 TABLET, FILM COATED ORAL DAILY
Refills: 0 | Status: DISCONTINUED | OUTPATIENT
Start: 2019-10-02 | End: 2019-10-02

## 2019-10-01 RX ORDER — TICAGRELOR 90 MG/1
1 TABLET ORAL
Qty: 60 | Refills: 0
Start: 2019-10-01 | End: 2019-10-30

## 2019-10-01 RX ORDER — ATORVASTATIN CALCIUM 80 MG/1
1 TABLET, FILM COATED ORAL
Qty: 30 | Refills: 0
Start: 2019-10-01 | End: 2019-10-30

## 2019-10-01 RX ORDER — SODIUM CHLORIDE 9 MG/ML
1000 INJECTION INTRAMUSCULAR; INTRAVENOUS; SUBCUTANEOUS
Refills: 0 | Status: DISCONTINUED | OUTPATIENT
Start: 2019-10-01 | End: 2019-10-02

## 2019-10-01 RX ORDER — ASPIRIN/CALCIUM CARB/MAGNESIUM 324 MG
1 TABLET ORAL
Qty: 30 | Refills: 0
Start: 2019-10-01 | End: 2019-10-30

## 2019-10-01 RX ORDER — LISINOPRIL 2.5 MG/1
1 TABLET ORAL
Qty: 30 | Refills: 0
Start: 2019-10-01 | End: 2019-10-30

## 2019-10-01 RX ORDER — LISINOPRIL 2.5 MG/1
5 TABLET ORAL DAILY
Refills: 0 | Status: DISCONTINUED | OUTPATIENT
Start: 2019-10-01 | End: 2019-10-02

## 2019-10-01 RX ORDER — TICAGRELOR 90 MG/1
90 TABLET ORAL EVERY 12 HOURS
Refills: 0 | Status: DISCONTINUED | OUTPATIENT
Start: 2019-10-01 | End: 2019-10-01

## 2019-10-01 RX ORDER — CLOPIDOGREL BISULFATE 75 MG/1
300 TABLET, FILM COATED ORAL ONCE
Refills: 0 | Status: COMPLETED | OUTPATIENT
Start: 2019-10-01 | End: 2019-10-01

## 2019-10-01 RX ADMIN — LISINOPRIL 5 MILLIGRAM(S): 2.5 TABLET ORAL at 16:40

## 2019-10-01 RX ADMIN — PANTOPRAZOLE SODIUM 40 MILLIGRAM(S): 20 TABLET, DELAYED RELEASE ORAL at 06:11

## 2019-10-01 RX ADMIN — Medication 81 MILLIGRAM(S): at 09:50

## 2019-10-01 RX ADMIN — SODIUM CHLORIDE 100 MILLILITER(S): 9 INJECTION INTRAMUSCULAR; INTRAVENOUS; SUBCUTANEOUS at 17:08

## 2019-10-01 RX ADMIN — CLOPIDOGREL BISULFATE 300 MILLIGRAM(S): 75 TABLET, FILM COATED ORAL at 16:50

## 2019-10-01 RX ADMIN — SODIUM CHLORIDE 75 MILLILITER(S): 9 INJECTION, SOLUTION INTRAVENOUS at 08:26

## 2019-10-01 RX ADMIN — Medication 50 MILLIGRAM(S): at 05:21

## 2019-10-01 RX ADMIN — AMLODIPINE BESYLATE 5 MILLIGRAM(S): 2.5 TABLET ORAL at 05:21

## 2019-10-01 RX ADMIN — HEPARIN SODIUM 5000 UNIT(S): 5000 INJECTION INTRAVENOUS; SUBCUTANEOUS at 05:21

## 2019-10-01 RX ADMIN — Medication 0.4 MILLIGRAM(S): at 08:14

## 2019-10-01 RX ADMIN — Medication 10 MILLIGRAM(S): at 15:40

## 2019-10-01 RX ADMIN — Medication 100 MILLIGRAM(S): at 17:57

## 2019-10-01 RX ADMIN — ATORVASTATIN CALCIUM 80 MILLIGRAM(S): 80 TABLET, FILM COATED ORAL at 21:49

## 2019-10-01 RX ADMIN — HEPARIN SODIUM 5000 UNIT(S): 5000 INJECTION INTRAVENOUS; SUBCUTANEOUS at 17:57

## 2019-10-01 RX ADMIN — Medication 50 MILLIGRAM(S): at 17:57

## 2019-10-01 RX ADMIN — HEPARIN SODIUM 5000 UNIT(S): 5000 INJECTION INTRAVENOUS; SUBCUTANEOUS at 21:50

## 2019-10-01 NOTE — PROGRESS NOTE ADULT - ASSESSMENT
54 year old male patient with HTN, pre-diabetes, hiatal hernia, Gout presented for chest pain.     # Recurrent chest pain, unstable angina secondary to severe proximal LAD stenosis on CCTA   - Went for cardiac cath 10/1 due to findings on CCTA  - Continue Aspirin 81 mg, Lipitor 80 mg daily, and Metoprolol tartrate 50 mg BID (Hold for HR < 60)   - A1c 5.6  - Continue Telemetry monitoring   - NPO after midnight for now for possible cath tomorrow pending cardiology recommendations   - Troponin and ECG STAT if chest pain recurs  - Nitroglycerin PRN for chest pain   - Discussed with Dr. Mendiola : Will give Plavix 300 mg once and Lovenox 1  mg/kg once and continue IVF     # HTN   - Continue Amlodipine 5 mg daily, history of non-compliance     # Suspected sleep apnea   - Patient states he snores a lot and reports that he sleeps more than 12 hours / day and doesn't feel refreshed   - outpatient sleep studies and pulmonary referral     # Hiatal hernia   - Protonix 40 mg daily     # History of gout   - Continue Allopurinol 100 mg daily     # Pre-diabetes   - F/U A1C level   - Lifestyle and diet counselling on discharge     #Activity: ambulate as tolerated  #DVT ppx: Heparin 5000U subq q8h  #GI ppx: PO Protonix 450 mg qD  #Diet: DASH/TLC  #Code status: FULL CODE  #Dispo: acute for now, from home  #Follow-up: ____________________________________ 54 year old male patient with HTN, pre-diabetes, hiatal hernia, Gout presented for chest pain.     # Recurrent chest pain, unstable angina secondary to severe proximal LAD stenosis on CCTA   - Went for cardiac cath 10/1 due to findings on CCTA. NTG sublingual given x1 this AM prior to cath 2/2 cp  - Continue Aspirin 81 mg, Lipitor 80 mg daily, and Metoprolol tartrate 50 mg BID (Hold for HR < 60)   - A1c 5.6  - Lipid profile: serum cholesterol 168, TG elevated at 271, HDL low at 31,   - Continue Telemetry monitoring post-cath  - F/u cardiology recs after cath    # HTN   - Continue Amlodipine 5 mg daily, history of non-compliance     # Suspected sleep apnea   - Patient states he snores a lot and reports that he sleeps more than 12 hours/day and doesn't feel refreshed   - Outpatient sleep studies and pulmonary referral     # Hiatal hernia   - Protonix 40 mg daily     # History of gout   - Continue Allopurinol 100 mg daily, history of non-compliance    # Pre-diabetes   - A1c 5.6%    # Newly diagnosed hyperlipidemia  - C/w high-intensity statin  - Lifestyle and diet counselling on discharge     #Activity: ambulate as tolerated  #DVT ppx: Heparin 5000U subq q8h  #GI ppx: PO Protonix 450 mg qD  #Diet: DASH/TLC  #Code status: FULL CODE  #Dispo: acute for now, from home  #Follow-up: ____Cardiology recs post-cath_________________________ 54 year old male patient with HTN, pre-diabetes, hiatal hernia, Gout presented for chest pain.     # Recurrent chest pain, unstable angina secondary to severe proximal LAD stenosis on CCTA   - Went for cardiac cath 10/1 due to findings on CCTA. NTG sublingual given x1 this AM prior to cath 2/2 cp  - Continue Aspirin 81 mg, Lipitor 80 mg daily, and Metoprolol tartrate 50 mg BID (Hold for HR < 60)   - A1c 5.6  - Lipid profile: serum cholesterol 168, TG elevated at 271, HDL low at 31,   - Continue Telemetry monitoring post-cath  - TTE ordered. F/u results  - F/u cardiology recs after cath  - Monitor BP post-cath    # HTN   - Continue Amlodipine 5 mg daily, history of non-compliance     # Suspected sleep apnea   - Patient states he snores a lot and reports that he sleeps more than 12 hours/day and doesn't feel refreshed   - Outpatient sleep studies and pulmonary referral     # Hiatal hernia   - Protonix 40 mg daily     # History of gout   - Continue Allopurinol 100 mg daily, history of non-compliance    # Pre-diabetes   - A1c 5.6%    # Newly diagnosed hyperlipidemia  - C/w high-intensity statin  - Lifestyle and diet counselling on discharge     #Activity: ambulate as tolerated  #DVT ppx: Heparin 5000U subq q8h  #GI ppx: PO Protonix 450 mg qD  #Diet: DASH/TLC  #Code status: FULL CODE  #Dispo: acute for now, from home  #Follow-up: ____Cardiology recs post-cath, TTE, BP monitoring_________________________

## 2019-10-01 NOTE — CHART NOTE - NSCHARTNOTEFT_GEN_A_CORE
PRE-OP DIAGNOSIS: angina Class III, abnormal CTA coronaries, severe LAD stenosis    PROCEDURE: [facility] with coronary angiography    Physician: Dr Mendiola  Assistant: Kip Marmolejo    ANESTHESIA TYPE:  [  ]General Anesthesia  [  ] Sedation  [ x ] Local/Regional    ESTIMATED BLOOD LOSS:    10   mL    CONDITION  [  ] Critical  [  ] Serious  [  ]Fair  [ x ]Good      SPECIMENS REMOVED (IF APPLICABLE): N/A      IV CONTRAST:   300          mL      IMPLANTS (IF APPLICABLE)      FINDINGS    Left Heart Catheterization:  LVEF%: 60  LVEDP: normal         LEFT HEART CATHETERIZATION                                    Left main normal     LAD:   mid LAD 99% lesion,                      Diag: multiple lesion, ostial 99%, 90 % mid segment    Left Circumflex: normal  OM: normal     Right Coronary Artery: moderate disease  RPDA 85% lesion           DOMINANCE: Right    ACCESS: right radial  CLOSURE: D stat    INTERVENTION  PCI to Mid LAD SYNERGY 3 x 32  PCI to Diag SYNERGY 2.5 x 32         POST-OP DIAGNOSIS  significant 2 vessel disease, s/p PCI to LAD, Diag        PLAN OF CARE    [x ] Return to In-patient bed  [x ]  Continue DAPT, B-blocker & Statin therapy

## 2019-10-01 NOTE — PROGRESS NOTE ADULT - SUBJECTIVE AND OBJECTIVE BOX
PREOPERATIVE DAY OF PROCEDURE EVALUATION:  I have personally seen and examined the patient.  I agree with the history and physical which I have reviewed and noted any changes below.  (Signed electronically by ______Dr dos santos____)  10-01-19 @ 11:09        Pre cath note:    indication:  [ ] STEMI                [ ] NSTEMI                 [ ] Acute coronary syndrome                     [ ]Unstable Angina   [ ] high risk  [ ] intermediate risk  [ ] low risk                     [ ] Stable Angina     non-invasive testing:    CTA coronaries                     Date:   9/2019                  result: [x ] high risk  [ ] intermediate risk  [ ] low risk    Anti- Anginal medications:                    [ ] not used                       [x ] used                   [ ] not used but strong indication not to use    Ejection Fraction                   [ ] <29            [ ] 30-39%   [ ] 40-49%     [ ]>50%    CHF                   [ ] active (within last 14 days on meds   [ ] Chronic (on meds but no exacerbation)    COPD                   [ ] mild (on chronic bronchodilators)  [ ] moderate (on chronic steroid therapy)      [ ] severe (indication for home O2 or PACO2 >50)    Other risk factors:                       [ ] Previous MI                     [ ] CVA/ stroke                    [ ] carotid stent/ CEA                    [ ] PVD/PAD- (arterial aneurysm, non-palpable pulses, tortuous vessel with inability to insert catheter, infra-renal dissection, renal or subclavian artery stenosis)                    [ ] diabetic                    [ ] previous CABG                    [ ] Renal Failure                           16.1   10.28 )-----------( 273      ( 01 Oct 2019 06:21 )             47.3     10-01    139  |  103  |  15  ----------------------------<  104<H>  4.6   |  22  |  1.0    Ca    9.4      01 Oct 2019 06:21  Mg     2.2     10-01    TPro  6.7  /  Alb  4.1  /  TBili  0.4  /  DBili  x   /  AST  16  /  ALT  29  /  AlkPhos  73  10-01

## 2019-10-01 NOTE — PROGRESS NOTE ADULT - SUBJECTIVE AND OBJECTIVE BOX
SUBJECTIVE:    Patient is a 54y old Male who presents with a chief complaint of Chest pain (01 Oct 2019 11:07)    Currently admitted to medicine with the primary diagnosis of Chest pain     Today is hospital day 1d. This morning he is resting comfortably in bed when seen and examined.     INTERVAL EVENTS: States he has had intermittent chest pain since being on floor. States he feels generally unwell. States his cp is 3/10, comes and goes, and radiates to his left arm from shoulder to hand. Denies shortness of breath or abdominal pain. Denies dysuria. States last BM was 2 days ago, but has not had much to eat. States he has been non-compliant with blood pressure medications and his Allopurinol prior to hospitalization.     PAST MEDICAL & SURGICAL HISTORY  Prediabetes  Chronic gout of foot, unspecified cause, unspecified laterality  Essential hypertension  Hiatal hernia  History of tonsillectomy and adenoidectomy    ALLERGIES:  No Known Allergies    MEDICATIONS:  STANDING MEDICATIONS  allopurinol 100 milliGRAM(s) Oral daily  amLODIPine   Tablet 5 milliGRAM(s) Oral daily  aspirin  chewable 81 milliGRAM(s) Oral daily  atorvastatin 80 milliGRAM(s) Oral at bedtime  heparin  Injectable 5000 Unit(s) SubCutaneous every 8 hours  influenza   Vaccine 0.5 milliLiter(s) IntraMuscular once  lactated ringers. 1000 milliLiter(s) IV Continuous <Continuous>  metoprolol tartrate 50 milliGRAM(s) Oral two times a day  pantoprazole    Tablet 40 milliGRAM(s) Oral before breakfast    PRN MEDICATIONS  nitroglycerin     SubLingual 0.4 milliGRAM(s) SubLingual every 5 minutes PRN    VITALS:   T(F): 95.8  HR: 82  BP: 191/102  RR: 19  SpO2: 100%    LABS:                        16.1   10.28 )-----------( 273      ( 01 Oct 2019 06:21 )             47.3     10-01    139  |  103  |  15  ----------------------------<  104<H>  4.6   |  22  |  1.0    Ca    9.4      01 Oct 2019 06:21  Mg     2.2     10-01    TPro  6.7  /  Alb  4.1  /  TBili  0.4  /  DBili  x   /  AST  16  /  ALT  29  /  AlkPhos  73  10-01    PT/INR - ( 01 Oct 2019 06:21 )   PT: 12.40 sec;   INR: 1.08 ratio    PTT - ( 01 Oct 2019 06:21 )  PTT:45.3 sec    Troponin T, Serum: <0.01 ng/mL (10-01-19 @ 06:21)    CARDIAC MARKERS ( 01 Oct 2019 06:21 )  x     / <0.01 ng/mL / x     / x     / x        RADIOLOGY:      PHYSICAL EXAM:  GEN: No acute distress  PULM/CHEST: Clear to auscultation bilaterally, no rales, rhonchi or wheezes   CVS: Regular rate and rhythm, S1-S2, no murmurs  ABD: Soft, non-tender, non-distended, +BS  EXT: No edema  NEURO: AAOx3    Brar Catheter: SUBJECTIVE:    Patient is a 54y old Male who presents with a chief complaint of Chest pain (01 Oct 2019 11:07)    Currently admitted to medicine with the primary diagnosis of Chest pain     Today is hospital day 1d. This morning he is resting comfortably in bed when seen and examined.     INTERVAL EVENTS: States he has had intermittent chest pain since being on floor. States he feels generally unwell. States his cp is 3/10, comes and goes, and radiates to his left arm from shoulder to hand. Denies shortness of breath or abdominal pain. Denies dysuria. States last BM was 2 days ago, but has not had much to eat. States he has been non-compliant with blood pressure medications and his Allopurinol prior to hospitalization.     PAST MEDICAL & SURGICAL HISTORY  Prediabetes  Chronic gout of foot, unspecified cause, unspecified laterality  Essential hypertension  Hiatal hernia  History of tonsillectomy and adenoidectomy    ALLERGIES:  No Known Allergies    MEDICATIONS:  STANDING MEDICATIONS  allopurinol 100 milliGRAM(s) Oral daily  amLODIPine   Tablet 5 milliGRAM(s) Oral daily  aspirin  chewable 81 milliGRAM(s) Oral daily  atorvastatin 80 milliGRAM(s) Oral at bedtime  heparin  Injectable 5000 Unit(s) SubCutaneous every 8 hours  influenza   Vaccine 0.5 milliLiter(s) IntraMuscular once  lactated ringers. 1000 milliLiter(s) IV Continuous <Continuous>  metoprolol tartrate 50 milliGRAM(s) Oral two times a day  pantoprazole    Tablet 40 milliGRAM(s) Oral before breakfast    PRN MEDICATIONS  nitroglycerin     SubLingual 0.4 milliGRAM(s) SubLingual every 5 minutes PRN    VITALS:   T(F): 95.8  HR: 82  BP: 191/102  RR: 19  SpO2: 100%    LABS:                        16.1   10. )-----------( 273      ( 01 Oct 2019 06:21 )             47.3     10    139  |  103  |  15  ----------------------------<  104<H>  4.6   |  22  |  1.0    Ca    9.4      01 Oct 2019 06:21  Mg     2.2     10    TPro  6.7  /  Alb  4.1  /  TBili  0.4  /  DBili  x   /  AST  16  /  ALT  29  /  AlkPhos  73  10-01    PT/INR - ( 01 Oct 2019 06:21 )   PT: 12.40 sec;   INR: 1.08 ratio    PTT - ( 01 Oct 2019 06:21 )  PTT:45.3 sec    Troponin T, Serum: <0.01 ng/mL (10-01-19 @ 06:21)    CARDIAC MARKERS ( 01 Oct 2019 06:21 )  x     / <0.01 ng/mL / x     / x     / x        RADIOLOGY:  < from: CT Heart with Coronaries (19 @ 10:57) >  EXAM:  CT HEART WITH CORONARIES          PROCEDURE DATE:  2019      INTERPRETATION:    CLINICAL INDICATION: Chest pain.    TECHNIQUE: Initially, unenhanced axial ECG-triggered CT was obtained   through the chest. CT angiography of theheart was then performed   utilizing prospective ECG-gated imaging.    CONTRAST: 85 ml of Optiray 320 IV    MEDICATION: Nitrostat tablet 400 mcg SL  Metoprolol: Refer to EMR   Cardizem: Refer to EMR    POST PROCESSING:3D advanced post-processing was performed by the   interpreting physician using an independent workstation utilizing a   combination of MIP and MPR techniques to better evaluate anatomy and   disease process.  3D rendering was performed .    COMPARISON: None    FINDINGS:    CORONARYCALCIUM SCORE:   Left Main: 0  Left Anterior Descendin  Left Circumflex: 0  Right Coronary: 0  Total Calcium Score: 151    SMALL Percentile Rank: 86 (The observed calcium score of 151 is at   percentile  86 for subjects of the same age, gender, and race/ethnicity who are free   of  clinical cardiovascular disease and treated diabetes.)    CORONARY ANGIOGRAPHY:    CORONARY ANATOMY:  Right Coronary: Normal origin from right coronary cusp.  Left Coronary: Normal origin from left coronary cusp    RIGHT CORONARY ARTERY (RCA): Normal.    LEFT MAIN (LM): Patent without any evidence of flow-limiting stenosis.   Bifurcation: To Left anterior descending and left circumflex coronary   arteries.       LEFT ANTERIOR DESCENDING (LAD): Severe stenosis of proximal LAD, related   to mixed atherosclerotic plaques. Patent distal LAD    LEFT CIRCUMFLEX (LCX): Patent without any evidence of flow-limiting   stenosis.. High origin of first obtuse marginal branch, normal variant.     Dominance of coronary artery system: Right.    AORTA:   Ascending Aorta: 4 cm  Descending Aorta: 2.6 cm  At the level of diaphragmatic hiatus:  2.6 cm    FUNCTIONAL ANALYSIS: Not performed as the study was performed using   prospective gating to reduce radiation dose.     ADDITIONAL FINDINGS:  None.    IMPRESSION:    1.  Severe stenosis involving proximal LAD, related to mixed   atherosclerotic plaques.  2.  Total coronary calcium score is 151.  For a 54 years old male , this   corresponds to 86 [place] percentile rank.     PHYSICAL EXAM:  GEN: No acute distress  PULM/CHEST: Clear to auscultation bilaterally, no rales, rhonchi or wheezes   CVS: Regular rate and rhythm, S1-S2, no murmurs  ABD: Soft, non-tender, non-distended, +BS  EXT: No edema  NEURO: AAOx3

## 2019-10-02 ENCOUNTER — TRANSCRIPTION ENCOUNTER (OUTPATIENT)
Age: 54
End: 2019-10-02

## 2019-10-02 VITALS
DIASTOLIC BLOOD PRESSURE: 70 MMHG | HEART RATE: 90 BPM | SYSTOLIC BLOOD PRESSURE: 121 MMHG | TEMPERATURE: 97 F | RESPIRATION RATE: 18 BRPM

## 2019-10-02 LAB
ANION GAP SERPL CALC-SCNC: 13 MMOL/L — SIGNIFICANT CHANGE UP (ref 7–14)
BASOPHILS # BLD AUTO: 0.07 K/UL — SIGNIFICANT CHANGE UP (ref 0–0.2)
BASOPHILS NFR BLD AUTO: 0.5 % — SIGNIFICANT CHANGE UP (ref 0–1)
BUN SERPL-MCNC: 17 MG/DL — SIGNIFICANT CHANGE UP (ref 10–20)
CALCIUM SERPL-MCNC: 9.4 MG/DL — SIGNIFICANT CHANGE UP (ref 8.5–10.1)
CHLORIDE SERPL-SCNC: 103 MMOL/L — SIGNIFICANT CHANGE UP (ref 98–110)
CO2 SERPL-SCNC: 23 MMOL/L — SIGNIFICANT CHANGE UP (ref 17–32)
CREAT SERPL-MCNC: 1.1 MG/DL — SIGNIFICANT CHANGE UP (ref 0.7–1.5)
EOSINOPHIL # BLD AUTO: 0.58 K/UL — SIGNIFICANT CHANGE UP (ref 0–0.7)
EOSINOPHIL NFR BLD AUTO: 4.1 % — SIGNIFICANT CHANGE UP (ref 0–8)
GLUCOSE SERPL-MCNC: 99 MG/DL — SIGNIFICANT CHANGE UP (ref 70–99)
HCT VFR BLD CALC: 46.9 % — SIGNIFICANT CHANGE UP (ref 42–52)
HGB BLD-MCNC: 16 G/DL — SIGNIFICANT CHANGE UP (ref 14–18)
IMM GRANULOCYTES NFR BLD AUTO: 0.4 % — HIGH (ref 0.1–0.3)
LYMPHOCYTES # BLD AUTO: 18.8 % — LOW (ref 20.5–51.1)
LYMPHOCYTES # BLD AUTO: 2.64 K/UL — SIGNIFICANT CHANGE UP (ref 1.2–3.4)
MCHC RBC-ENTMCNC: 28.2 PG — SIGNIFICANT CHANGE UP (ref 27–31)
MCHC RBC-ENTMCNC: 34.1 G/DL — SIGNIFICANT CHANGE UP (ref 32–37)
MCV RBC AUTO: 82.7 FL — SIGNIFICANT CHANGE UP (ref 80–94)
MONOCYTES # BLD AUTO: 1.04 K/UL — HIGH (ref 0.1–0.6)
MONOCYTES NFR BLD AUTO: 7.4 % — SIGNIFICANT CHANGE UP (ref 1.7–9.3)
NEUTROPHILS # BLD AUTO: 9.7 K/UL — HIGH (ref 1.4–6.5)
NEUTROPHILS NFR BLD AUTO: 68.8 % — SIGNIFICANT CHANGE UP (ref 42.2–75.2)
NRBC # BLD: 0 /100 WBCS — SIGNIFICANT CHANGE UP (ref 0–0)
PLATELET # BLD AUTO: 308 K/UL — SIGNIFICANT CHANGE UP (ref 130–400)
POTASSIUM SERPL-MCNC: 4 MMOL/L — SIGNIFICANT CHANGE UP (ref 3.5–5)
POTASSIUM SERPL-SCNC: 4 MMOL/L — SIGNIFICANT CHANGE UP (ref 3.5–5)
RBC # BLD: 5.67 M/UL — SIGNIFICANT CHANGE UP (ref 4.7–6.1)
RBC # FLD: 13.4 % — SIGNIFICANT CHANGE UP (ref 11.5–14.5)
SODIUM SERPL-SCNC: 139 MMOL/L — SIGNIFICANT CHANGE UP (ref 135–146)
WBC # BLD: 14.08 K/UL — HIGH (ref 4.8–10.8)
WBC # FLD AUTO: 14.08 K/UL — HIGH (ref 4.8–10.8)

## 2019-10-02 PROCEDURE — 93306 TTE W/DOPPLER COMPLETE: CPT | Mod: 26

## 2019-10-02 PROCEDURE — 99232 SBSQ HOSP IP/OBS MODERATE 35: CPT

## 2019-10-02 PROCEDURE — 99239 HOSP IP/OBS DSCHRG MGMT >30: CPT

## 2019-10-02 PROCEDURE — 93010 ELECTROCARDIOGRAM REPORT: CPT

## 2019-10-02 RX ORDER — ALLOPURINOL 300 MG
1 TABLET ORAL
Qty: 30 | Refills: 0
Start: 2019-10-02 | End: 2019-10-31

## 2019-10-02 RX ORDER — AMLODIPINE BESYLATE 2.5 MG/1
1 TABLET ORAL
Qty: 0 | Refills: 0 | DISCHARGE

## 2019-10-02 RX ORDER — ALLOPURINOL 300 MG
1 TABLET ORAL
Qty: 0 | Refills: 0 | DISCHARGE

## 2019-10-02 RX ORDER — LISINOPRIL 2.5 MG/1
1 TABLET ORAL
Qty: 30 | Refills: 0
Start: 2019-10-02 | End: 2019-10-31

## 2019-10-02 RX ADMIN — LISINOPRIL 5 MILLIGRAM(S): 2.5 TABLET ORAL at 05:49

## 2019-10-02 RX ADMIN — HEPARIN SODIUM 5000 UNIT(S): 5000 INJECTION INTRAVENOUS; SUBCUTANEOUS at 05:49

## 2019-10-02 RX ADMIN — PANTOPRAZOLE SODIUM 40 MILLIGRAM(S): 20 TABLET, DELAYED RELEASE ORAL at 06:04

## 2019-10-02 RX ADMIN — CLOPIDOGREL BISULFATE 75 MILLIGRAM(S): 75 TABLET, FILM COATED ORAL at 12:06

## 2019-10-02 RX ADMIN — Medication 81 MILLIGRAM(S): at 12:06

## 2019-10-02 RX ADMIN — Medication 100 MILLIGRAM(S): at 12:06

## 2019-10-02 RX ADMIN — Medication 50 MILLIGRAM(S): at 05:49

## 2019-10-02 NOTE — PROGRESS NOTE ADULT - SUBJECTIVE AND OBJECTIVE BOX
Cardiology Follow up    MIGUELAVANIANN   54y Male  PAST MEDICAL & SURGICAL HISTORY:  Prediabetes  Chronic gout of foot, unspecified cause, unspecified laterality  Essential hypertension  Hiatal hernia  History of tonsillectomy and adenoidectomy     HPI:  54 year old male patient with HTN, pre-diabetes, hiatal hernia, Gout presented for chest pain.   The patient states that his chest pain started Saturday at 1 AM, left sided described as " needles on the chest" radiating to the left subaxillary region and left arm, associated with palpitations lasted 15 minutes, for which he took Aspirin and rubbed his chest with some improvement. The pain recurred again at 3 AM and lasted 5 minutes and then at 6 AM and lasted a shorter period of time then during the day the patient was walking to a bagel store and felt some weakness of the lower extremities and numbness of the fingers and noticed that his gait was wobbly so he decided to present to the ED.   The patient reports that on the 17th after coming back from work he felt flushing associated with nausea and cough and excessive sweats, he had some chest pain at the time but not as intense as the current episode, since then he has been having loss of appetite and weakness. The patient denies any history of angina and states that he used to get some minimal chest pain described as pinching sensation unrelated to activity but it never was as intense as the current episode.   He is active at his work, denies smoking, and there is CAD in the grandmother. The patient states that he had a stress test done in 2012 or 2013 and was normal.   In the ED troponin negative, ECG negative for ischemic changes and the patient was admitted for observation. Subsequently a CCTA was done and was positive for severe stenosis of the proximal LAD and the patient was admitted for possible cardiac cath. (30 Sep 2019 14:32)    Allergies    No Known Allergies    Patient without complaints. Pt ambulated without issues/symptoms  Denies CP, SOB, palpitations, or dizziness  No events on telemetry overnight    Vital Signs Last 24 Hrs  T(C): 35.3 (02 Oct 2019 05:04), Max: 35.8 (01 Oct 2019 20:19)  T(F): 95.6 (02 Oct 2019 05:04), Max: 96.4 (01 Oct 2019 20:19)  HR: 74 (02 Oct 2019 05:04) (73 - 85)  BP: 139/81 (02 Oct 2019 05:04) (139/81 - 161/115)  BP(mean): 131 (01 Oct 2019 17:48) (131 - 131)  RR: 18 (02 Oct 2019 05:04) (18 - 18)  SpO2: --    MEDICATIONS  (STANDING):  allopurinol 100 milliGRAM(s) Oral daily  aspirin  chewable 81 milliGRAM(s) Oral daily  atorvastatin 80 milliGRAM(s) Oral at bedtime  clopidogrel Tablet 75 milliGRAM(s) Oral daily  heparin  Injectable 5000 Unit(s) SubCutaneous every 8 hours  influenza   Vaccine 0.5 milliLiter(s) IntraMuscular once  lactated ringers. 1000 milliLiter(s) (75 mL/Hr) IV Continuous <Continuous>  lisinopril 5 milliGRAM(s) Oral daily  metoprolol tartrate 50 milliGRAM(s) Oral two times a day  pantoprazole    Tablet 40 milliGRAM(s) Oral before breakfast  sodium chloride 0.9%. 1000 milliLiter(s) (100 mL/Hr) IV Continuous <Continuous>    MEDICATIONS  (PRN):  nitroglycerin     SubLingual 0.4 milliGRAM(s) SubLingual every 5 minutes PRN Chest Pain    REVIEW OF SYSTEMS:          CONSTITUTIONAL: No weakness, fevers or chills          EYES/ENT: No visual changes;  No vertigo or throat pain           NECK: No pain or stiffness          RESPIRATORY: No cough, wheezing, hemoptysis          CARDIOVASCULAR: no pain, no DEMARCO, no palpitations           GASTROINTESTINAL: No abdominal or epigastric pain. No nausea, vomiting, or hematemesis;           GENITOURINARY: No dysuria, frequency or hematuria          NEUROLOGICAL: No numbness or weakness          SKIN: No itching, rashes    PHYSICAL EXAM:           CONSTITUTIONAL: Well-developed; well-nourished; in no acute distress  	SKIN: warm, dry  	HEAD: Normocephalic; atraumatic  	EYES: PERRL.  	ENT: No nasal discharge, airway clear, mucous membranes moist  	NECK: Supple; non tender.  	CARD: +S1, +S2, no murmurs, gallops, or rubs. Regular rate and rhythm    	RESP: No wheezes, rales or rhonchi. CTA B/L  	ABD: soft ntnd, + BS x 4 quadrants  	EXT: moves all extremities,  no clubbing, cyanosis or edema  	NEURO: Alert and oriented x3, no focal deficits          PSYCH: Cooperative, appropriate          VASCULAR:  +2 Rad / +2PTs / + 2DPs          EXTREMITY:              Right Radial: Dressing removed, access site soft, no hematoma, no pain, + pulses, no sign of infection             ECG:   < from: 12 Lead ECG (10.02.19 @ 07:54) >  Ventricular Rate 67 BPM    Atrial Rate 67 BPM    P-R Interval 186 ms    QRS Duration 92 ms    Q-T Interval 458 ms    QTC Calculation(Bezet) 483 ms    P Axis 32 degrees    R Axis -2 degrees    T Axis 16 degrees    Diagnosis Line Normal sinus rhythm  Moderate voltage criteria for LVH, may be normal variant  Prolonged QT  Abnormal ECG    Confirmed by Reyes Kumari ([id number]) on 10/2/2019 9:09:59 AM                                                                                                              2D ECHO:  < from: OBSERVATION (10.02.19 @ 10:06) >  CardExmStatus_ExamStatus Exam Completed  Report Pending    LABS:                        16.0   14.08 )-----------( 308      ( 02 Oct 2019 06:06 )             46.9     10-02    139  |  103  |  17  ----------------------------<  99  4.0   |  23  |  1.1    Ca    9.4      02 Oct 2019 06:06  Mg     2.2     10-01    TPro  6.7  /  Alb  4.1  /  TBili  0.4  /  DBili  x   /  AST  16  /  ALT  29  /  AlkPhos  73  10-01    CARDIAC MARKERS ( 01 Oct 2019 06:21 )  x     / <0.01 ng/mL / x     / x     / x          LIVER FUNCTIONS - ( 01 Oct 2019 06:21 )  Alb: 4.1 g/dL / Pro: 6.7 g/dL / ALK PHOS: 73 U/L / ALT: 29 U/L / AST: 16 U/L / GGT: x             A/P:  I discussed the case with Cardiologist Dr. Mendiola and recommend the following:    S/P PCI: RACHAEL LAD/D1                      f/u 2D Echo results                    Monitor s/p Cardiac Cath access site                    Continue DAPT ( Aspirin 81 mg daily and Plavix 75 mg daily ), B-Blocker, Statin Therapy                   Patient given 30 day supply of ( Aspirin 81 mg daily and Plavix 75 mg daily ) to take at home                   Patient agreeing to take DAPT for at least one year or as directed by cardiologist                    Pt given instructions on importance of taking antiplatelet medication or risk acute stent thrombosis/death                   Post cath instructions, access site care and activity restrictions reviewed with patient                     Discussed with patient to return to hospital if experience chest pain, shortness breath, dizziness and site bleeding                   Aggressive risk factor modification, diet counseling, smoking cessation discussed with patient                       Can discharge patient from cardiac standpoint after ambulating without symptoms                    Follow up with Cardiology Dr. Mendiola in one week.  Instructed to call and make an appointment

## 2019-10-02 NOTE — CHART NOTE - NSCHARTNOTEFT_GEN_A_CORE
<<<RESIDENT DISCHARGE NOTE>>>     ANN RIZZO  MRN-[id number]    VITAL SIGNS:  T(F): 96.6 (10-02-19 @ 13:10), Max: 96.6 (10-02-19 @ 13:10)  HR: 90 (10-02-19 @ 13:10)  BP: 121/70 (10-02-19 @ 13:10)  SpO2: --    PHYSICAL EXAMINATION:  GEN: No acute distress, obese  PULM/CHEST: Clear to auscultation bilaterally, no rales, rhonchi or wheezes   CVS: Regular rate and rhythm, S1-S2, no murmurs  ABD: Soft, non-tender, non-distended, +BS  EXT: No edema  NEURO: AAOx3    TEST RESULTS:                        16.0   14.08 )-----------( 308      ( 02 Oct 2019 06:06 )             46.9       10-02    139  |  103  |  17  ----------------------------<  99  4.0   |  23  |  1.1    Ca    9.4      02 Oct 2019 06:06  Mg     2.2     10-01    TPro  6.7  /  Alb  4.1  /  TBili  0.4  /  DBili  x   /  AST  16  /  ALT  29  /  AlkPhos  73  10-01      FINAL DISCHARGE INTERVIEW:  Resident(s) Present: (Name:____Lena________), RN Present: (Name:  ___Alena________)    DISCHARGE MEDICATION RECONCILIATION  reviewed with Attending (Name:___Mallory________)    DISPOSITION:   [x  ] Home,    [  ] Home with Visiting Nursing Services,   [    ]  SNF/ NH,    [   ] Acute Rehab (4A),   [   ] Other (Specify:_________)

## 2019-10-02 NOTE — DISCHARGE NOTE NURSING/CASE MANAGEMENT/SOCIAL WORK - PATIENT PORTAL LINK FT
You can access the FollowMyHealth Patient Portal offered by [facility] by registering at the following website: [email or web address]. By joining [facility]’s FollowMyHealth portal, you will also be able to view your health information using other applications (apps) compatible with our system.

## 2019-10-02 NOTE — DISCHARGE NOTE PROVIDER - NSDCCPCAREPLAN_GEN_ALL_CORE_FT
PRINCIPAL DISCHARGE DIAGNOSIS  Diagnosis: Chest pain  Assessment and Plan of Treatment: You came to the hospital after experiencing chest pain. Testing revealed you needed to undergo coronary catheterization, which you did on 10/1. You were found to have blockage of two arteries in your heart and stents were placed to relieve these blockages. you have been started on medication for coronary artery disease and for proper maintenance of these stents. Please take all medications as prescribed for pickup at [facility] (in [facility]). Please follow-up with Dr. Ben Mendiola (cardiology) in one week for continued care. Please also follow-up with your primary care provider in one week for continued care.      SECONDARY DISCHARGE DIAGNOSES  Diagnosis: Aneurysm of ascending aorta  Assessment and Plan of Treatment: During your hospitalization, your aorta was found to be slightly enlarged for your age. Please follow-up with your primary care provider for continued care.    Diagnosis: Leucocytosis  Assessment and Plan of Treatment: While hospitalized, you were found to have an elevation in your white blood cell count. Please get a CBC (complete blood count) in one week through your primary care provider to ensure this is resolved. PRINCIPAL DISCHARGE DIAGNOSIS  Diagnosis: Chest pain  Assessment and Plan of Treatment: You came to the hospital after experiencing chest pain. Testing revealed you needed to undergo coronary catheterization, which you did on 10/1. You were found to have blockage of two arteries in your heart and stents were placed to relieve these blockages. you have been started on medication for coronary artery disease and for proper maintenance of these stents. Please take all medications as prescribed for pickup at [facility] (in [facility]). Please follow-up with Dr. Ben Mendiola (cardiology) in one week for continued care. Please also follow-up with your primary care provider in one week for continued care.      SECONDARY DISCHARGE DIAGNOSES  Diagnosis: Hyperlipidemia  Assessment and Plan of Treatment: You were found to have elevated cholesterol during your stay. Please take cholesterol medication as prescribed. Follow-up with your primary care provider to continue care.    Diagnosis: Aneurysm of ascending aorta  Assessment and Plan of Treatment: During your hospitalization, your aorta was found to be slightly enlarged for your age. Please follow-up with your primary care provider for continued care.    Diagnosis: Leucocytosis  Assessment and Plan of Treatment: While hospitalized, you were found to have an elevation in your white blood cell count. Please get a CBC (complete blood count) in one week through your primary care provider to ensure this is resolved.

## 2019-10-02 NOTE — DISCHARGE NOTE PROVIDER - CARE PROVIDER_API CALL
Ben Mendiola (MD)  Cardiovascular Disease; Internal Medicine; Interventional Cardiology  [address] [place] [address]  Phone: [phone]  Fax: [phone]  Follow Up Time: 1 week    Primary Care Provider,   Phone: (   )    -  Fax: (   )    -  Follow Up Time: 1 week

## 2019-10-02 NOTE — DISCHARGE NOTE PROVIDER - NSDCADMDATE_GEN_ALL_CORE_FT
Problem: Patient Care Overview (Adult)  Goal: Plan of Care Review  Outcome: Ongoing (interventions implemented as appropriate)   01/22/18 1247   Coping/Psychosocial Response Interventions   Plan Of Care Reviewed With patient   Patient Care Overview   Progress progress towards functional goals is fair   Outcome Evaluation   Outcome Summary/Follow up Plan PT re-eval complete. pt modA for supine to sit, maxA to return to supine, pt attempted to stand 2x but unable to clear bottom from the bed. pt would benefit from continued skilled PT to address weakness, decreased ativity tolerance, and impaired functional mobility. Anticipate pt would benefit from D/C to SNF.        Problem: Inpatient Physical Therapy  Goal: Bed Mobility Goal LTG- PT  Outcome: Ongoing (interventions implemented as appropriate)   01/10/18 1004 01/22/18 1247   Bed Mobility PT LTG   Bed Mobility PT LTG, Date Established 01/10/18 --    Bed Mobility PT LTG, Time to Achieve by discharge --    Bed Mobility PT LTG, Activity Type all bed mobility --    Bed Mobility PT LTG, [place] Level minimum assist (75% patient effort) --    Bed Mobility PT Goal LTG, Assist Device bed rails --    Bed Mobility PT LTG, Date Goal Reviewed --  01/22/18   Bed Mobility PT LTG, Outcome --  goal ongoing   Bed Mobility PT LTG, Reason Goal Not Met --  progress slower than expected     Goal: Transfer Training Goal 1 LTG- PT  Outcome: Revised   01/10/18 1004 01/22/18 1247   Transfer Training PT LTG   Transfer Training PT LTG, Date Established 01/10/18 --    Transfer Training PT LTG, Time to Achieve by discharge --    Transfer Training PT LTG, Activity Type bed to chair /chair to bed;sit to stand/stand to sit --    Transfer Training PT LTG, [place] Level --  minimum assist (75% patient effort);2 person assist required   Transfer Training PT LTG, Assist Device walker, gigi --    Transfer Training PT LTG, Date Goal Reviewed --  01/22/18   Transfer Training PT LTG, Outcome --   30-Sep-2019 13:46 goal revised   Transfer Training PT LTG, Reason Goal Not Met --  goals revised this date     Goal: Strength Goal LTG- PT  Outcome: Ongoing (interventions implemented as appropriate)   01/10/18 1004 01/22/18 1247   Strength Goal PT LTG   Strength Goal PT LTG, Date Established 01/10/18 --    Strength Goal PT LTG, Time to Achieve by discharge --    Strength Goal PT LTG, Functional Goal Perform 15 reps of EOB B LE AROM exercises --    Strength Goal PT LTG, Date Goal Reviewed --  01/22/18   Strength Goal PT LTG, Outcome --  goal ongoing   Strength Goal PT LTG, Reason Goal Not Met --  progress slower than expected

## 2019-10-02 NOTE — PROGRESS NOTE ADULT - SUBJECTIVE AND OBJECTIVE BOX
no chest pain   no sob   doing well   no diarrhea   no abdominal pain     Vital Signs Last 24 Hrs  T(C): 35.3 (02 Oct 2019 05:04), Max: 35.8 (01 Oct 2019 20:19)  T(F): 95.6 (02 Oct 2019 05:04), Max: 96.4 (01 Oct 2019 20:19)  HR: 74 (02 Oct 2019 05:04) (73 - 85)  BP: 139/81 (02 Oct 2019 05:04) (139/81 - 161/115)  BP(mean): 131 (01 Oct 2019 17:48) (131 - 131)  RR: 18 (02 Oct 2019 05:04) (18 - 18)  SpO2: --    PHYSICAL EXAM:  GENERAL: NAD, well-developed  HEAD:  Atraumatic, Normocephalic  EYES: EOMI, PERRLA, conjunctiva and sclera clear  NECK: Supple, No JVD  CHEST/LUNG: Clear to auscultation bilaterally; No wheeze  HEART: Regular rate and rhythm; No murmurs, rubs, or gallops  ABDOMEN: Soft, Nontender, Nondistended; Bowel sounds present  EXTREMITIES:  2+ Peripheral Pulses, No clubbing, cyanosis, or edema  PSYCH: AAOx3  NEUROLOGY: non-focal  SKIN: No rashes or lesions                          16.0   14.08 )-----------( 308      ( 02 Oct 2019 06:06 )             46.9     10-02    139  |  103  |  17  ----------------------------<  99  4.0   |  23  |  1.1    Ca    9.4      02 Oct 2019 06:06  Mg     2.2     10-01    TPro  6.7  /  Alb  4.1  /  TBili  0.4  /  DBili  x   /  AST  16  /  ALT  29  /  AlkPhos  73  10-01    LIVER FUNCTIONS - ( 01 Oct 2019 06:21 )  Alb: 4.1 g/dL / Pro: 6.7 g/dL / ALK PHOS: 73 U/L / ALT: 29 U/L / AST: 16 U/L / GGT: x           PT/INR - ( 01 Oct 2019 06:21 )   PT: 12.40 sec;   INR: 1.08 ratio         PTT - ( 01 Oct 2019 06:21 )  PTT:45.3 sec  CARDIAC MARKERS ( 01 Oct 2019 06:21 )  x     / <0.01 ng/mL / x     / x     / x

## 2019-10-02 NOTE — DISCHARGE NOTE PROVIDER - PROVIDER TOKENS
PROVIDER:[TOKEN:[[id number]],FOLLOWUP:[1 week]],FREE:[LAST:[Primary Care Provider],PHONE:[(   )    -],FAX:[(   )    -],FOLLOWUP:[1 week]]

## 2019-10-02 NOTE — DISCHARGE NOTE PROVIDER - HOSPITAL COURSE
54 year old male patient with HTN, pre-diabetes, hiatal hernia, Gout presented for chest pain.     The patient states that his chest pain started Saturday at 1 AM, left sided described as " needles on the chest" radiating to the left subaxillary region and left arm, associated with palpitations lasted 15 minutes, for which he took Aspirin and rubbed his chest with some improvement. The pain recurred again at 3 AM and lasted 5 minutes and then at 6 AM and lasted a shorter period of time then during the day the patient was walking to a bagel store and felt some weakness of the lower extremities and numbness of the fingers and noticed that his gait was wobbly so he decided to present to the ED.     The patient reports that on the 17th after coming back from work he felt flushing associated with nausea and cough and excessive sweats, he had some chest pain at the time but not as intense as the current episode, since then he has been having loss of appetite and weakness. The patient denies any history of angina and states that he used to get some minimal chest pain described as pinching sensation unrelated to activity but it never was as intense as the current episode.     He is active at his work, denies smoking, and there is CAD in the grandmother. The patient states that he had a stress test done in 2012 or 2013 and was normal.     In the ED troponin negative, ECG negative for ischemic changes and the patient was admitted for observation. Subsequently a CCTA was done and was positive for severe stenosis of the proximal LAD and the patient was admitted for possible cardiac cath. 54 year old male patient with HTN, pre-diabetes, hiatal hernia, Gout presented for chest pain.     The patient states that his chest pain started Saturday at 1 AM, left sided described as " needles on the chest" radiating to the left subaxillary region and left arm, associated with palpitations lasted 15 minutes, for which he took Aspirin and rubbed his chest with some improvement. The pain recurred again at 3 AM and lasted 5 minutes and then at 6 AM and lasted a shorter period of time then during the day the patient was walking to a bagel store and felt some weakness of the lower extremities and numbness of the fingers and noticed that his gait was wobbly so he decided to present to the ED.     The patient reports that on the 17th after coming back from work he felt flushing associated with nausea and cough and excessive sweats, he had some chest pain at the time but not as intense as the current episode, since then he has been having loss of appetite and weakness. The patient denies any history of angina and states that he used to get some minimal chest pain described as pinching sensation unrelated to activity but it never was as intense as the current episode.     He is active at his work, denies smoking, and there is CAD in the grandmother. The patient states that he had a stress test done in 2012 or 2013 and was normal.     In the ED troponin negative, ECG negative for ischemic changes and the patient was admitted for observation. Subsequently a CCTA was done and was positive for severe stenosis of the proximal LAD and the patient was admitted for possible cardiac cath.         unstable angina resolved:     s/p cath with PCI to LAD and diagonal     on asa and plavix, bb , lipitor. ace'I     echo read pending     cleared by cardiology for discharge         HTN: better controlled increase lisinopril tp 10 mg qday         leukocytosis 14 no signs of infection     patient has no symptoms     PE is unremarkable     likely stress related post cath     repeat cbc as OPT with pmd

## 2019-10-02 NOTE — DISCHARGE NOTE PROVIDER - CARE PROVIDERS DIRECT ADDRESSES
[email or web address]

## 2019-10-02 NOTE — PROGRESS NOTE ADULT - ASSESSMENT
unstable angina resolved:   s/p cath with PCI to LAD and diagonal   on asa and plavix, bb , lipitor. ace'I   echo read pending   cleared by cardiology for discharge     HTN: better controlled increase lisinopril tp 10 mg qday     leukocytosis 14 no signs of infection   patient has no symptoms   PE is unremarkable   likely stress related post cath   repeat cbc as OPT with pmd       discharge pending echo read     spent 35 min on discharge

## 2019-10-07 DIAGNOSIS — M10.9 GOUT, UNSPECIFIED: ICD-10-CM

## 2019-10-07 DIAGNOSIS — K44.9 DIAPHRAGMATIC HERNIA WITHOUT OBSTRUCTION OR GANGRENE: ICD-10-CM

## 2019-10-07 DIAGNOSIS — I25.110 ATHEROSCLEROTIC HEART DISEASE OF NATIVE CORONARY ARTERY WITH UNSTABLE ANGINA PECTORIS: ICD-10-CM

## 2019-10-07 DIAGNOSIS — R07.9 CHEST PAIN, UNSPECIFIED: ICD-10-CM

## 2019-10-07 DIAGNOSIS — E78.5 HYPERLIPIDEMIA, UNSPECIFIED: ICD-10-CM

## 2019-10-07 DIAGNOSIS — I10 ESSENTIAL (PRIMARY) HYPERTENSION: ICD-10-CM

## 2019-10-07 DIAGNOSIS — D72.829 ELEVATED WHITE BLOOD CELL COUNT, UNSPECIFIED: ICD-10-CM

## 2019-10-07 DIAGNOSIS — R73.03 PREDIABETES: ICD-10-CM

## 2019-10-07 DIAGNOSIS — I71.2 THORACIC AORTIC ANEURYSM, WITHOUT RUPTURE: ICD-10-CM

## 2019-10-16 PROBLEM — Z00.00 ENCOUNTER FOR PREVENTIVE HEALTH EXAMINATION: Status: ACTIVE | Noted: 2019-10-16

## 2019-10-17 ENCOUNTER — APPOINTMENT (OUTPATIENT)
Dept: CARDIOLOGY | Facility: CLINIC | Age: 54
End: 2019-10-17
Payer: SELF-PAY

## 2019-10-17 VITALS
WEIGHT: 248 LBS | SYSTOLIC BLOOD PRESSURE: 144 MMHG | HEART RATE: 90 BPM | HEIGHT: 73 IN | BODY MASS INDEX: 32.87 KG/M2 | DIASTOLIC BLOOD PRESSURE: 92 MMHG

## 2019-10-17 PROBLEM — I10 ESSENTIAL (PRIMARY) HYPERTENSION: Chronic | Status: ACTIVE | Noted: 2019-09-29

## 2019-10-17 PROBLEM — R73.03 PREDIABETES: Chronic | Status: ACTIVE | Noted: 2019-09-29

## 2019-10-17 PROBLEM — M1A.0790 IDIOPATHIC CHRONIC GOUT, UNSPECIFIED ANKLE AND FOOT, WITHOUT TOPHUS (TOPHI): Chronic | Status: ACTIVE | Noted: 2019-09-29

## 2019-10-17 PROBLEM — K44.9 DIAPHRAGMATIC HERNIA WITHOUT OBSTRUCTION OR GANGRENE: Chronic | Status: ACTIVE | Noted: 2019-09-29

## 2019-10-17 PROCEDURE — 93000 ELECTROCARDIOGRAM COMPLETE: CPT

## 2019-10-17 PROCEDURE — 99214 OFFICE O/P EST MOD 30 MIN: CPT

## 2019-10-17 RX ORDER — ASPIRIN 81 MG
81 TABLET, DELAYED RELEASE (ENTERIC COATED) ORAL DAILY
Refills: 0 | Status: ACTIVE | COMMUNITY

## 2019-10-17 RX ORDER — LISINOPRIL 10 MG/1
10 TABLET ORAL
Qty: 90 | Refills: 2 | Status: ACTIVE | COMMUNITY

## 2019-10-17 RX ORDER — ALLOPURINOL 100 MG/1
100 TABLET ORAL DAILY
Qty: 30 | Refills: 2 | Status: ACTIVE | COMMUNITY

## 2019-10-17 RX ORDER — ATORVASTATIN CALCIUM 80 MG/1
80 TABLET, FILM COATED ORAL
Qty: 90 | Refills: 0 | Status: ACTIVE | COMMUNITY

## 2019-10-17 RX ORDER — METOPROLOL TARTRATE 50 MG/1
50 TABLET, FILM COATED ORAL
Qty: 180 | Refills: 3 | Status: ACTIVE | COMMUNITY

## 2019-10-17 NOTE — PHYSICAL EXAM
[General Appearance - Well Developed] : well developed [Normal Appearance] : normal appearance [Well Groomed] : well groomed [No Deformities] : no deformities [General Appearance - Well Nourished] : well nourished [General Appearance - In No Acute Distress] : no acute distress [Normal Conjunctiva] : the conjunctiva exhibited no abnormalities [Eyelids - No Xanthelasma] : the eyelids demonstrated no xanthelasmas [Normal Oral Mucosa] : normal oral mucosa [No Oral Pallor] : no oral pallor [No Oral Cyanosis] : no oral cyanosis [Normal Jugular Venous A Waves Present] : normal jugular venous A waves present [Normal Jugular Venous V Waves Present] : normal jugular venous V waves present [Heart Rate And Rhythm] : heart rate and rhythm were normal [No Jugular Venous Rios A Waves] : no jugular venous rios A waves [Heart Sounds] : normal S1 and S2 [Murmurs] : no murmurs present [Respiration, Rhythm And Depth] : normal respiratory rhythm and effort [Exaggerated Use Of Accessory Muscles For Inspiration] : no accessory muscle use [Auscultation Breath Sounds / Voice Sounds] : lungs were clear to auscultation bilaterally [Abdomen Tenderness] : non-tender [Abdomen Soft] : soft [Abdomen Mass (___ Cm)] : no abdominal mass palpated [Nail Clubbing] : no clubbing of the fingernails [Cyanosis, Localized] : no localized cyanosis [Petechial Hemorrhages (___cm)] : no petechial hemorrhages [Skin Color & Pigmentation] : normal skin color and pigmentation [] : no rash [No Venous Stasis] : no venous stasis [No Skin Ulcers] : no skin ulcer [Skin Lesions] : no skin lesions [No Xanthoma] : no  xanthoma was observed [Oriented To Time, Place, And Person] : oriented to person, place, and time [Affect] : the affect was normal [No Anxiety] : not feeling anxious [Mood] : the mood was normal

## 2019-10-17 NOTE — ASSESSMENT
[FreeTextEntry1] : cad\par s/p nstemi\par s/p pci of lad and diagonal\par severe pda disease\par lv dysfunction

## 2019-10-17 NOTE — REASON FOR VISIT
[Initial Evaluation] : an initial evaluation of [FreeTextEntry2] : cad and s/p pci [FreeTextEntry1] : 10/1/2019\par pci of lad and diagonal\par pda severe lesion\par ef of 45%\par c/o fatigue\par no chest pain\par no sob\par no chf

## 2019-11-04 ENCOUNTER — MEDICATION RENEWAL (OUTPATIENT)
Age: 54
End: 2019-11-04

## 2019-11-04 RX ORDER — CLOPIDOGREL BISULFATE 75 MG/1
75 TABLET, FILM COATED ORAL DAILY
Qty: 30 | Refills: 2 | Status: ACTIVE | COMMUNITY
Start: 1900-01-01 | End: 1900-01-01

## 2019-11-25 ENCOUNTER — OUTPATIENT (OUTPATIENT)
Dept: OUTPATIENT SERVICES | Facility: HOSPITAL | Age: 54
LOS: 1 days | Discharge: HOME | End: 2019-11-25
Payer: SUBSIDIZED

## 2019-11-25 DIAGNOSIS — Z98.890 OTHER SPECIFIED POSTPROCEDURAL STATES: Chronic | ICD-10-CM

## 2019-11-25 DIAGNOSIS — R07.9 CHEST PAIN, UNSPECIFIED: ICD-10-CM

## 2019-11-25 PROCEDURE — 71046 X-RAY EXAM CHEST 2 VIEWS: CPT | Mod: 26

## 2019-12-05 ENCOUNTER — APPOINTMENT (OUTPATIENT)
Dept: CARDIOLOGY | Facility: CLINIC | Age: 54
End: 2019-12-05
Payer: SELF-PAY

## 2019-12-05 VITALS
HEIGHT: 73 IN | HEART RATE: 99 BPM | BODY MASS INDEX: 33.4 KG/M2 | SYSTOLIC BLOOD PRESSURE: 152 MMHG | DIASTOLIC BLOOD PRESSURE: 90 MMHG | WEIGHT: 252 LBS

## 2019-12-05 PROCEDURE — 99214 OFFICE O/P EST MOD 30 MIN: CPT

## 2019-12-05 PROCEDURE — 93000 ELECTROCARDIOGRAM COMPLETE: CPT

## 2019-12-05 NOTE — PHYSICAL EXAM
[General Appearance - Well Developed] : well developed [Normal Appearance] : normal appearance [General Appearance - Well Nourished] : well nourished [Well Groomed] : well groomed [General Appearance - In No Acute Distress] : no acute distress [No Deformities] : no deformities [Normal Conjunctiva] : the conjunctiva exhibited no abnormalities [Eyelids - No Xanthelasma] : the eyelids demonstrated no xanthelasmas [Normal Oral Mucosa] : normal oral mucosa [No Oral Pallor] : no oral pallor [No Oral Cyanosis] : no oral cyanosis [Normal Jugular Venous A Waves Present] : normal jugular venous A waves present [Normal Jugular Venous V Waves Present] : normal jugular venous V waves present [No Jugular Venous Rios A Waves] : no jugular venous rios A waves [Heart Rate And Rhythm] : heart rate and rhythm were normal [Heart Sounds] : normal S1 and S2 [Murmurs] : no murmurs present [Respiration, Rhythm And Depth] : normal respiratory rhythm and effort [Exaggerated Use Of Accessory Muscles For Inspiration] : no accessory muscle use [Auscultation Breath Sounds / Voice Sounds] : lungs were clear to auscultation bilaterally [Abdomen Soft] : soft [Abdomen Tenderness] : non-tender [Abdomen Mass (___ Cm)] : no abdominal mass palpated [Nail Clubbing] : no clubbing of the fingernails [Cyanosis, Localized] : no localized cyanosis [Petechial Hemorrhages (___cm)] : no petechial hemorrhages [Skin Color & Pigmentation] : normal skin color and pigmentation [] : no rash [Skin Lesions] : no skin lesions [No Venous Stasis] : no venous stasis [No Xanthoma] : no  xanthoma was observed [No Skin Ulcers] : no skin ulcer [Oriented To Time, Place, And Person] : oriented to person, place, and time [Affect] : the affect was normal [Mood] : the mood was normal [No Anxiety] : not feeling anxious

## 2019-12-10 NOTE — REASON FOR VISIT
[Initial Evaluation] : an initial evaluation of [FreeTextEntry2] : cad and s/p pci [FreeTextEntry1] : 10/1/2019\par pci of lad and diagonal\par pda severe lesion\par ef of 45%\par c/o fatigue\par no chest pain\par no sob\par no chf\par stable\par no new cardiac complaints

## 2020-01-06 ENCOUNTER — APPOINTMENT (OUTPATIENT)
Dept: CARDIOLOGY | Facility: CLINIC | Age: 55
End: 2020-01-06
Payer: SELF-PAY

## 2020-01-06 DIAGNOSIS — Z95.5 PRESENCE OF CORONARY ANGIOPLASTY IMPLANT AND GRAFT: ICD-10-CM

## 2020-01-06 DIAGNOSIS — I25.10 ATHEROSCLEROTIC HEART DISEASE OF NATIVE CORONARY ARTERY W/OUT ANGINA PECTORIS: ICD-10-CM

## 2020-01-06 PROCEDURE — 93015 CV STRESS TEST SUPVJ I&R: CPT

## 2020-01-07 PROBLEM — I25.10 CAD (CORONARY ARTERY DISEASE): Status: ACTIVE | Noted: 2019-10-17

## 2020-01-07 PROBLEM — Z95.5 S/P CORONARY ARTERY STENT PLACEMENT: Status: ACTIVE | Noted: 2019-10-17

## 2020-01-16 ENCOUNTER — APPOINTMENT (OUTPATIENT)
Dept: CARDIOLOGY | Facility: CLINIC | Age: 55
End: 2020-01-16

## 2020-03-20 ENCOUNTER — TRANSCRIPTION ENCOUNTER (OUTPATIENT)
Age: 55
End: 2020-03-20

## 2020-05-06 NOTE — ED CDU PROVIDER INITIAL DAY NOTE - CARDIOVASCULAR [-], MLM
no syncope/no peripheral edema/no palpitations Mohs Rapid Report Verbiage: The area of clinically evident tumor was marked with skin marking ink and appropriately hatched.  The initial incision was made following the Mohs approach through the skin.  The specimen was taken to the lab, divided into the necessary number of pieces, chromacoded and processed according to the Mohs protocol.  This was repeated in successive stages until a tumor free defect was achieved.

## 2020-06-14 ENCOUNTER — INPATIENT (INPATIENT)
Facility: HOSPITAL | Age: 55
LOS: 2 days | Discharge: HOME | End: 2020-06-17
Attending: HOSPITALIST | Admitting: HOSPITALIST
Payer: COMMERCIAL

## 2020-06-14 ENCOUNTER — TRANSCRIPTION ENCOUNTER (OUTPATIENT)
Age: 55
End: 2020-06-14

## 2020-06-14 VITALS
TEMPERATURE: 97 F | SYSTOLIC BLOOD PRESSURE: 184 MMHG | DIASTOLIC BLOOD PRESSURE: 110 MMHG | HEART RATE: 89 BPM | RESPIRATION RATE: 18 BRPM | OXYGEN SATURATION: 97 %

## 2020-06-14 DIAGNOSIS — Z98.890 OTHER SPECIFIED POSTPROCEDURAL STATES: Chronic | ICD-10-CM

## 2020-06-14 LAB
ALBUMIN SERPL ELPH-MCNC: 4.2 G/DL — SIGNIFICANT CHANGE UP (ref 3.5–5.2)
ALP SERPL-CCNC: 83 U/L — SIGNIFICANT CHANGE UP (ref 30–115)
ALT FLD-CCNC: 16 U/L — SIGNIFICANT CHANGE UP (ref 0–41)
ANION GAP SERPL CALC-SCNC: 13 MMOL/L — SIGNIFICANT CHANGE UP (ref 7–14)
APTT BLD: 32 SEC — SIGNIFICANT CHANGE UP (ref 27–39.2)
AST SERPL-CCNC: 13 U/L — SIGNIFICANT CHANGE UP (ref 0–41)
BILIRUB SERPL-MCNC: 0.9 MG/DL — SIGNIFICANT CHANGE UP (ref 0.2–1.2)
BUN SERPL-MCNC: 11 MG/DL — SIGNIFICANT CHANGE UP (ref 10–20)
CALCIUM SERPL-MCNC: 8.8 MG/DL — SIGNIFICANT CHANGE UP (ref 8.5–10.1)
CHLORIDE SERPL-SCNC: 106 MMOL/L — SIGNIFICANT CHANGE UP (ref 98–110)
CO2 SERPL-SCNC: 23 MMOL/L — SIGNIFICANT CHANGE UP (ref 17–32)
CREAT SERPL-MCNC: 1.1 MG/DL — SIGNIFICANT CHANGE UP (ref 0.7–1.5)
GLUCOSE SERPL-MCNC: 134 MG/DL — HIGH (ref 70–99)
HCT VFR BLD CALC: 45.9 % — SIGNIFICANT CHANGE UP (ref 42–52)
HGB BLD-MCNC: 15.6 G/DL — SIGNIFICANT CHANGE UP (ref 14–18)
INR BLD: 1.03 RATIO — SIGNIFICANT CHANGE UP (ref 0.65–1.3)
MAGNESIUM SERPL-MCNC: 2.2 MG/DL — SIGNIFICANT CHANGE UP (ref 1.8–2.4)
MCHC RBC-ENTMCNC: 28.6 PG — SIGNIFICANT CHANGE UP (ref 27–31)
MCHC RBC-ENTMCNC: 34 G/DL — SIGNIFICANT CHANGE UP (ref 32–37)
MCV RBC AUTO: 84.1 FL — SIGNIFICANT CHANGE UP (ref 80–94)
NRBC # BLD: 0 /100 WBCS — SIGNIFICANT CHANGE UP (ref 0–0)
NT-PROBNP SERPL-SCNC: 639 PG/ML — HIGH (ref 0–300)
PLATELET # BLD AUTO: 194 K/UL — SIGNIFICANT CHANGE UP (ref 130–400)
POTASSIUM SERPL-MCNC: 3.5 MMOL/L — SIGNIFICANT CHANGE UP (ref 3.5–5)
POTASSIUM SERPL-SCNC: 3.5 MMOL/L — SIGNIFICANT CHANGE UP (ref 3.5–5)
PROT SERPL-MCNC: 6.6 G/DL — SIGNIFICANT CHANGE UP (ref 6–8)
PROTHROM AB SERPL-ACNC: 11.9 SEC — SIGNIFICANT CHANGE UP (ref 9.95–12.87)
RBC # BLD: 5.46 M/UL — SIGNIFICANT CHANGE UP (ref 4.7–6.1)
RBC # FLD: 14.1 % — SIGNIFICANT CHANGE UP (ref 11.5–14.5)
SARS-COV-2 RNA SPEC QL NAA+PROBE: SIGNIFICANT CHANGE UP
SODIUM SERPL-SCNC: 142 MMOL/L — SIGNIFICANT CHANGE UP (ref 135–146)
TROPONIN T SERPL-MCNC: <0.01 NG/ML — SIGNIFICANT CHANGE UP
WBC # BLD: 8.23 K/UL — SIGNIFICANT CHANGE UP (ref 4.8–10.8)
WBC # FLD AUTO: 8.23 K/UL — SIGNIFICANT CHANGE UP (ref 4.8–10.8)

## 2020-06-14 PROCEDURE — 99285 EMERGENCY DEPT VISIT HI MDM: CPT

## 2020-06-14 PROCEDURE — 71045 X-RAY EXAM CHEST 1 VIEW: CPT | Mod: 26

## 2020-06-14 PROCEDURE — 93010 ELECTROCARDIOGRAM REPORT: CPT

## 2020-06-14 RX ORDER — ASPIRIN/CALCIUM CARB/MAGNESIUM 324 MG
325 TABLET ORAL ONCE
Refills: 0 | Status: COMPLETED | OUTPATIENT
Start: 2020-06-14 | End: 2020-06-14

## 2020-06-14 RX ORDER — CHLORHEXIDINE GLUCONATE 213 G/1000ML
1 SOLUTION TOPICAL
Refills: 0 | Status: DISCONTINUED | OUTPATIENT
Start: 2020-06-14 | End: 2020-06-17

## 2020-06-14 RX ORDER — ALLOPURINOL 300 MG
100 TABLET ORAL DAILY
Refills: 0 | Status: DISCONTINUED | OUTPATIENT
Start: 2020-06-14 | End: 2020-06-17

## 2020-06-14 RX ORDER — METOPROLOL TARTRATE 50 MG
50 TABLET ORAL
Refills: 0 | Status: DISCONTINUED | OUTPATIENT
Start: 2020-06-14 | End: 2020-06-17

## 2020-06-14 RX ORDER — ATORVASTATIN CALCIUM 80 MG/1
80 TABLET, FILM COATED ORAL AT BEDTIME
Refills: 0 | Status: DISCONTINUED | OUTPATIENT
Start: 2020-06-14 | End: 2020-06-17

## 2020-06-14 RX ORDER — ASPIRIN/CALCIUM CARB/MAGNESIUM 324 MG
81 TABLET ORAL DAILY
Refills: 0 | Status: DISCONTINUED | OUTPATIENT
Start: 2020-06-14 | End: 2020-06-17

## 2020-06-14 RX ORDER — TICAGRELOR 90 MG/1
60 TABLET ORAL EVERY 12 HOURS
Refills: 0 | Status: DISCONTINUED | OUTPATIENT
Start: 2020-06-14 | End: 2020-06-16

## 2020-06-14 RX ORDER — HEPARIN SODIUM 5000 [USP'U]/ML
5000 INJECTION INTRAVENOUS; SUBCUTANEOUS EVERY 8 HOURS
Refills: 0 | Status: DISCONTINUED | OUTPATIENT
Start: 2020-06-14 | End: 2020-06-17

## 2020-06-14 RX ADMIN — Medication 325 MILLIGRAM(S): at 17:26

## 2020-06-14 RX ADMIN — TICAGRELOR 60 MILLIGRAM(S): 90 TABLET ORAL at 21:51

## 2020-06-14 RX ADMIN — Medication 50 MILLIGRAM(S): at 21:36

## 2020-06-14 RX ADMIN — ATORVASTATIN CALCIUM 80 MILLIGRAM(S): 80 TABLET, FILM COATED ORAL at 21:36

## 2020-06-14 RX ADMIN — HEPARIN SODIUM 5000 UNIT(S): 5000 INJECTION INTRAVENOUS; SUBCUTANEOUS at 21:36

## 2020-06-14 NOTE — ED ADULT NURSE NOTE - PSH
History of tonsillectomy and adenoidectomy History of cardiac catheterization  x2 with total of 4 stents placed  History of tonsillectomy and adenoidectomy

## 2020-06-14 NOTE — ED ADULT NURSE NOTE - CHPI ED NUR SYMPTOMS NEG
no nausea/no weakness/no dizziness/no fever/no decreased eating/drinking/no pain/no chills/no vomiting/no tingling

## 2020-06-14 NOTE — ED ADULT NURSE NOTE - FAMILY HISTORY
FH: colon cancer, father     Grandparent  Still living? Unknown  Family history of acute myocardial infarction, Age at diagnosis: Age Unknown

## 2020-06-14 NOTE — ED PROVIDER NOTE - OBJECTIVE STATEMENT
56y/o male with hx CAD s/p stents, HTN, Gout BIBA from [facility] c/o  "I had mid sternal chest tightness/ pain non-radiating that started while at rest this am. Last week I was having SOB, difficulty going upstairs." no cough/ fever/ chills/ abd pain

## 2020-06-14 NOTE — H&P ADULT - NSICDXPASTMEDICALHX_GEN_ALL_CORE_FT
PAST MEDICAL HISTORY:  CAD (coronary artery disease)     Chronic gout of foot, unspecified cause, unspecified laterality     Essential hypertension     Hiatal hernia     Prediabetes

## 2020-06-14 NOTE — ED PROVIDER NOTE - SKIN NEGATIVE STATEMENT, MLM
51M with multiple comorbidities presents for eval of L sided chest pain, pleuritic in nature.  CT shows 4x2.5 spiculated mass with central cavity.  Seen by pulm, will admit to floor.  r/o TB. no abrasions, no jaundice, no lesions, no pruritis, and no rashes.

## 2020-06-14 NOTE — ED ADULT NURSE NOTE - PMH
Chronic gout of foot, unspecified cause, unspecified laterality    Essential hypertension    Hiatal hernia    Prediabetes CAD (coronary artery disease)    Chronic gout of foot, unspecified cause, unspecified laterality    Essential hypertension    Hiatal hernia    Prediabetes

## 2020-06-14 NOTE — ED PROVIDER NOTE - CLINICAL SUMMARY MEDICAL DECISION MAKING FREE TEXT BOX
pt presents w/ episode of chest pain w/ associated diaphoresis monday, intermittent chest tightness since. ekg nonischaemic. labs negative, will admit for further w/u. case d/w Dr. Mendiola

## 2020-06-14 NOTE — H&P ADULT - NSICDXPASTSURGICALHX_GEN_ALL_CORE_FT
PAST SURGICAL HISTORY:  History of cardiac catheterization x2 with total of 4 stents placed    History of tonsillectomy and adenoidectomy

## 2020-06-14 NOTE — H&P ADULT - ASSESSMENT
56 y/o male patient with HTN, pre-diabetes, hiatal hernia, Gout presented for chest pain/sob.     # Recurrent chest pain and chest pain unstable angina secondary to severe proximal LAD stenosis on CCTA   - Cardiology consulted, nuke stress test or cath as per malpeso  - Continue Aspirin 81 mg, c/w Lipitor 80 mg daily and Metoprolol tartrate 50 mg BID  - Continue Telemetry monitoring   - NPO after midnight for now for possible cath tomorrow pending cardiology recommendations   - Troponin f/u    # HTN   - Continue Amlodipine 5 mg daily, history of non-compliance     # Hiatal hernia   - Protonix 40 mg daily     # History of gout   - Continue Allopurinol 100 mg daily       Activity: ambulate as tolerated  DVT : hep 5k tid  gi ppx: ppi  diet: DASH TLC diet  Full code 56 y/o male patient with HTN, pre-diabetes, hiatal hernia, Gout presented for chest pain/sob.     # Recurrent chest pain and chest pain unstable angina secondary to severe proximal LAD stenosis on CCTA   - Continue Aspirin 81 mg, c/w Lipitor 80 mg daily and Metoprolol tartrate 50 mg BID  - Continue Telemetry monitoring   - NPO after midnight for now for possible cath tomorrow pending cardiology recommendations   - Troponin f/u  - cardiac cath in am as per cardiology.    # HTN   - Continue Amlodipine 5 mg daily, history of non-compliance     # Hiatal hernia   - Protonix 40 mg daily     # History of gout   - Continue Allopurinol 100 mg daily       Activity: ambulate as tolerated  DVT : hep 5k tid  gi ppx: ppi  diet: DASH TLC diet  Full code

## 2020-06-14 NOTE — H&P ADULT - HISTORY OF PRESENT ILLNESS
54 y/o male pmh CAD s/p stents, UC, HTN, Gout BIBA from [facility] presented for chest pain. Chest pain is described as mid-sternal chest tightness/ pain which is non-radiating started while at rest this am. Patient explains pain is tolerable  Last week patient describes having SOB which was exertional.  It has not progressed but has been stable.  Patient explains he was here in October and had a left heart cath with 2 stents placed PCI to Mid LAD SYNERGY 3 x 32 and PCI to Diag SYNERGY 2.5 x 32 and patient was at that time started on dapt.  Patient cardiologist dr dos santos aware patient is here for the above symptoms.  Explains he wanted to admit for nuke stress test or possible cath.  No other complaints.

## 2020-06-14 NOTE — ED PROVIDER NOTE - ATTENDING CONTRIBUTION TO CARE
56 yo m hx cad s/p PCI (Cards: Marlena), htn, gout here for CP. pt states Monday he had decreased exertional tolerance for 1 day. Pt states this am he had an episode of chest tightness. no nauesa or vomiting. no diaphoresis. no recent surg/travel. 54 yo m hx cad s/p PCI (Cards: Marlena), htn, gout here for CP. pt states Monday he had decreased exertional tolerance for 1 day w/ associated chest tightness and diaphoresis. Sx improved Tuesday through today but pt endorsed persistent mild sternal chest tightness.  Pt states this am he had a worse episode of chest tightness and decided to come to ED.  no recent surg/travel. no LE edema. Of note, pt states after pci oct 2019 he had normal stress january. in the spring, he felt chest tightness and went to an ED in [place] and had 2 more stents placed    vss  gen- NAD, aaox3  card-rrr  lungs-ctab, no wheezing or rhonchi  abd-sntnd, no guarding or rebound  neuro- full str/sensation, cn ii-xii grossly intact, normal coordination and gait    will get basic labs, ekg/trop, telemetry, will contact marlena, obs vs admit for acs r/o

## 2020-06-15 LAB
ANION GAP SERPL CALC-SCNC: 11 MMOL/L — SIGNIFICANT CHANGE UP (ref 7–14)
BASOPHILS # BLD AUTO: 0.06 K/UL — SIGNIFICANT CHANGE UP (ref 0–0.2)
BASOPHILS NFR BLD AUTO: 0.7 % — SIGNIFICANT CHANGE UP (ref 0–1)
BUN SERPL-MCNC: 12 MG/DL — SIGNIFICANT CHANGE UP (ref 10–20)
CALCIUM SERPL-MCNC: 8.6 MG/DL — SIGNIFICANT CHANGE UP (ref 8.5–10.1)
CHLORIDE SERPL-SCNC: 105 MMOL/L — SIGNIFICANT CHANGE UP (ref 98–110)
CO2 SERPL-SCNC: 25 MMOL/L — SIGNIFICANT CHANGE UP (ref 17–32)
CREAT SERPL-MCNC: 1 MG/DL — SIGNIFICANT CHANGE UP (ref 0.7–1.5)
EOSINOPHIL # BLD AUTO: 0.29 K/UL — SIGNIFICANT CHANGE UP (ref 0–0.7)
EOSINOPHIL NFR BLD AUTO: 3.2 % — SIGNIFICANT CHANGE UP (ref 0–8)
GLUCOSE SERPL-MCNC: 87 MG/DL — SIGNIFICANT CHANGE UP (ref 70–99)
HCT VFR BLD CALC: 44.1 % — SIGNIFICANT CHANGE UP (ref 42–52)
HGB BLD-MCNC: 15 G/DL — SIGNIFICANT CHANGE UP (ref 14–18)
IMM GRANULOCYTES NFR BLD AUTO: 0.4 % — HIGH (ref 0.1–0.3)
LYMPHOCYTES # BLD AUTO: 2.95 K/UL — SIGNIFICANT CHANGE UP (ref 1.2–3.4)
LYMPHOCYTES # BLD AUTO: 32.3 % — SIGNIFICANT CHANGE UP (ref 20.5–51.1)
MAGNESIUM SERPL-MCNC: 2.1 MG/DL — SIGNIFICANT CHANGE UP (ref 1.8–2.4)
MCHC RBC-ENTMCNC: 29.2 PG — SIGNIFICANT CHANGE UP (ref 27–31)
MCHC RBC-ENTMCNC: 34 G/DL — SIGNIFICANT CHANGE UP (ref 32–37)
MCV RBC AUTO: 85.8 FL — SIGNIFICANT CHANGE UP (ref 80–94)
MONOCYTES # BLD AUTO: 0.79 K/UL — HIGH (ref 0.1–0.6)
MONOCYTES NFR BLD AUTO: 8.6 % — SIGNIFICANT CHANGE UP (ref 1.7–9.3)
NEUTROPHILS # BLD AUTO: 5.01 K/UL — SIGNIFICANT CHANGE UP (ref 1.4–6.5)
NEUTROPHILS NFR BLD AUTO: 54.8 % — SIGNIFICANT CHANGE UP (ref 42.2–75.2)
NRBC # BLD: 0 /100 WBCS — SIGNIFICANT CHANGE UP (ref 0–0)
PHOSPHATE SERPL-MCNC: 3.1 MG/DL — SIGNIFICANT CHANGE UP (ref 2.1–4.9)
PLATELET # BLD AUTO: 171 K/UL — SIGNIFICANT CHANGE UP (ref 130–400)
POTASSIUM SERPL-MCNC: 3.8 MMOL/L — SIGNIFICANT CHANGE UP (ref 3.5–5)
POTASSIUM SERPL-SCNC: 3.8 MMOL/L — SIGNIFICANT CHANGE UP (ref 3.5–5)
RBC # BLD: 5.14 M/UL — SIGNIFICANT CHANGE UP (ref 4.7–6.1)
RBC # FLD: 14.2 % — SIGNIFICANT CHANGE UP (ref 11.5–14.5)
SODIUM SERPL-SCNC: 141 MMOL/L — SIGNIFICANT CHANGE UP (ref 135–146)
TROPONIN T SERPL-MCNC: <0.01 NG/ML — SIGNIFICANT CHANGE UP
TROPONIN T SERPL-MCNC: <0.01 NG/ML — SIGNIFICANT CHANGE UP
WBC # BLD: 9.14 K/UL — SIGNIFICANT CHANGE UP (ref 4.8–10.8)
WBC # FLD AUTO: 9.14 K/UL — SIGNIFICANT CHANGE UP (ref 4.8–10.8)

## 2020-06-15 PROCEDURE — 99223 1ST HOSP IP/OBS HIGH 75: CPT | Mod: AI

## 2020-06-15 PROCEDURE — 93306 TTE W/DOPPLER COMPLETE: CPT | Mod: 26

## 2020-06-15 RX ORDER — NIFEDIPINE 30 MG
30 TABLET, EXTENDED RELEASE 24 HR ORAL ONCE
Refills: 0 | Status: COMPLETED | OUTPATIENT
Start: 2020-06-15 | End: 2020-06-15

## 2020-06-15 RX ADMIN — ATORVASTATIN CALCIUM 80 MILLIGRAM(S): 80 TABLET, FILM COATED ORAL at 21:44

## 2020-06-15 RX ADMIN — Medication 50 MILLIGRAM(S): at 17:04

## 2020-06-15 RX ADMIN — Medication 50 MILLIGRAM(S): at 05:37

## 2020-06-15 RX ADMIN — HEPARIN SODIUM 5000 UNIT(S): 5000 INJECTION INTRAVENOUS; SUBCUTANEOUS at 05:37

## 2020-06-15 RX ADMIN — HEPARIN SODIUM 5000 UNIT(S): 5000 INJECTION INTRAVENOUS; SUBCUTANEOUS at 21:44

## 2020-06-15 RX ADMIN — Medication 100 MILLIGRAM(S): at 11:58

## 2020-06-15 RX ADMIN — Medication 81 MILLIGRAM(S): at 11:58

## 2020-06-15 RX ADMIN — Medication 30 MILLIGRAM(S): at 02:27

## 2020-06-15 RX ADMIN — TICAGRELOR 60 MILLIGRAM(S): 90 TABLET ORAL at 17:05

## 2020-06-15 RX ADMIN — TICAGRELOR 60 MILLIGRAM(S): 90 TABLET ORAL at 05:37

## 2020-06-15 RX ADMIN — HEPARIN SODIUM 5000 UNIT(S): 5000 INJECTION INTRAVENOUS; SUBCUTANEOUS at 14:51

## 2020-06-15 NOTE — PROGRESS NOTE ADULT - ASSESSMENT
54 y/o male patient with HTN, pre-diabetes, hiatal hernia, Gout presented for chest pain/sob.     # Recurrent chest pain and chest pain - unstable angina secondary to severe proximal LAD stenosis on CCTA   - Cardiology eval noted, appreciated.    Planning for cardiac cath in AM.    WILL KEEP NPO post MN.  - Continue Aspirin 81 mg, c/w Lipitor 80 mg daily and Metoprolol tartrate 50 mg BID  - Continue Telemetry monitoring   - Troponin negative x3    # HTN   - Continue Amlodipine 5 mg daily, history of non-compliance     # Hiatal hernia   - Protonix 40 mg daily     # History of gout   - Continue Allopurinol 100 mg daily       Activity: ambulate as tolerated  DVT : hep 5k tid  gi ppx: ppi  diet: DASH TLC diet  Full code    #Progress Note Handoff  Pending (specify):  Cath in am 6/16___  Disposition: Home_______

## 2020-06-15 NOTE — CONSULT NOTE ADULT - SUBJECTIVE AND OBJECTIVE BOX
Date of Admission:    CHIEF COMPLAINT:    HISTORY OF PRESENT ILLNESS: 56 y/o male pmh CAD s/p stents, UC, HTN, Gout BIBA from [facility] presented for chest pain. Chest pain is described as mid-sternal chest tightness/ pain which is non-radiating started while at rest this am. Patient explains pain is tolerable  Last week patient describes having SOB which was exertional.  It has not progressed but has been stable.  Patient explains he was here in October and had a left heart cath with 2 stents placed PCI to Mid LAD SYNERGY 3 x 32 and PCI to Diag SYNERGY 2.5 x 32 and patient was at that time started on dapt.  Patient cardiologist dr dos santos aware patient is here for the above symptoms.  Explains he wanted to admit for nuke stress test or possible cath.  No other complaints. (14 Jun 2020 18:16)      PAST MEDICAL & SURGICAL HISTORY:  CAD (coronary artery disease)  Prediabetes  Chronic gout of foot, unspecified cause, unspecified laterality  Essential hypertension  Hiatal hernia  History of cardiac catheterization: x2 with total of 4 stents placed  History of tonsillectomy and adenoidectomy      FAMILY HISTORY:  [ ] no pertinent family history of premature cardiovascular disease in first degree relatives.  Mother:   Father:   Siblings:     SOCIAL HISTORY:    [ ] Non-smoker  [ ] Smoker  [ ] Alcohol    Allergies    No Known Allergies    Intolerances    	    REVIEW OF SYSTEMS:  CONSTITUTIONAL: denies fever, weight loss, or fatigue  CARDIOLOGY: see HPI  RESPIRATORY: denies shortness of breath, wheezing.   NEUROLOGICAL: denies weakness, no focal deficits to report.  ENDOCRINOLOGICAL: no recent change in diabetic medications.   GI: no BRBPR, no N,V, diarrhea.    PSYCHIATRY: normal mood and affect  HEENT: no nasal discharge, no ecchymosis  SKIN: no ecchymosis, no breakdown  MUSCULOSKELETAL: Full range of motion x4.     PHYSICAL EXAM:  T(C): 36.2 (06-15-20 @ 12:29), Max: 36.8 (06-14-20 @ 21:05)  HR: 67 (06-15-20 @ 12:29) (59 - 89)  BP: 144/80 (06-15-20 @ 12:29) (144/80 - 189/116)  RR: 18 (06-15-20 @ 12:29) (17 - 19)  SpO2: 99% (06-14-20 @ 21:05) (97% - 99%)  Wt(kg): --  I&O's Summary      General Appearance: well appearing, normal for age and gender. 	  Neck: normal JVP, no bruit.   Eyes: No xanthomalasia, Extra Ocular muscles intact.   Cardiovascular: regular rate and rhythm S1 S2, No JVD, No murmurs, No edema  Respiratory: Lungs clear to auscultation	  Psychiatry: Alert and oriented x 3, Mood & affect appropriate  Gastrointestinal:  Soft, Non-tender  Skin/Integumen: No rashes, No ecchymoses, No cyanosis	  Neurologic: Non-focal  Musculoskeletal/extremities: Normal range of motion, No clubbing, cyanosis or edema  Vascular: Peripheral pulses palpable 2+ bilaterally    LABS:	 	                          15.0   9.14  )-----------( 171      ( 15 Abundio 2020 05:55 )             44.1     06-15    141  |  105  |  12  ----------------------------<  87  3.8   |  25  |  1.0    Ca    8.6      15 Abundio 2020 05:55  Phos  3.1     06-15  Mg     2.1     06-15    TPro  6.6  /  Alb  4.2  /  TBili  0.9  /  DBili  x   /  AST  13  /  ALT  16  /  AlkPhos  83  06-14    CARDIAC MARKERS ( 15 Abundio 2020 05:55 )  x     / <0.01 ng/mL / x     / x     / x      CARDIAC MARKERS ( 15 Abundio 2020 00:20 )  x     / <0.01 ng/mL / x     / x     / x      CARDIAC MARKERS ( 14 Jun 2020 16:31 )  x     / <0.01 ng/mL / x     / x     / x          PT/INR - ( 14 Jun 2020 16:31 )   PT: 11.90 sec;   INR: 1.03 ratio         PTT - ( 14 Jun 2020 16:31 )  PTT:32.0 sec      TELEMETRY EVENTS: 	  Sinus bradycardia  ECG:  	Sinus, LVH, old septal infarct   RADIOLOGY:  < from: Xray Chest 1 View AP/PA (06.14.20 @ 16:29) >  IMPRESSION:      No radiographic evidence of acute cardiopulmonary disease.    < end of copied text >    OTHER: 	    PREVIOUS DIAGNOSTIC TESTING:    [ ] Echocardiogram:  < from: Transthoracic Echocardiogram (10.02.19 @ 09:23) >    Summary:   1. Left ventricular ejection fraction, by visual estimation, is 40 to   45%.   2. Mildly to moderately decreased segmental left ventricular systolic   function.   3. Increased LV wall thickness.   4. Spectral Doppler shows impaired relaxation pattern of left   ventricular myocardial filling (Grade I diastolic dysfunction).   5. Dilatation of the aortic root and ascending aorta.    < end of copied text >    [ ] Catheterization:    < from: Cardiac Cath Lab - Adult (10.01.19 @ 12:40) >  CORONARY CIRCULATION: There was 2-vessel coronary artery disease (LAD and    RCA). Mid LAD: There was a 99 % stenosis at a site with no prior    intervention. There was KEON grade 3 flow through the vessel (brisk flow).    This is a likely culprit for the patient's clinical presentation. An    intervention was performed. In a second lesion, there was a 70 % stenosis    at a site with no prior intervention. There was KEON grade 3 flow through    the vessel (brisk flow). This is a likely culprit for the patient's    clinical presentation. An intervention was performed.      < end of copied text >  < from: Cardiac Cath Lab - Adult (10.01.19 @ 12:40) >  Circumflex:    Angiography showed mild atherosclerosis with no flow limiting lesions. 1st    obtuse marginal: Normal. Ramus intermedius: The vessel was large sized.    Angiography showed minor luminal irregularities with no flow limiting    lesions. RCA: Angiography showed mild atherosclerosis Right PDA: There was    a tubular 85 % stenosis at a site with no prior intervention. There was    KEON grade 3 flow through the vessel (brisk flow).      < end of copied text >    	  Home Medications:  Brilinta (ticagrelor):  (14 Jun 2020 17:22)    MEDICATIONS  (STANDING):  allopurinol 100 milliGRAM(s) Oral daily  aspirin enteric coated 81 milliGRAM(s) Oral daily  atorvastatin 80 milliGRAM(s) Oral at bedtime  chlorhexidine 4% Liquid 1 Application(s) Topical <User Schedule>  heparin   Injectable 5000 Unit(s) SubCutaneous every 8 hours  metoprolol tartrate 50 milliGRAM(s) Oral two times a day  ticagrelor 60 milliGRAM(s) Oral every 12 hours    MEDICATIONS  (PRN):

## 2020-06-15 NOTE — CONSULT NOTE ADULT - ASSESSMENT
Patient is a 55y old  Male who presents with a chief complaint of dyspnea on exertion, associated with sweating, relieved by rest.    Unstable angina  CAD (coronary artery disease) s/p RACHAEL to D1 and mLAD   Prediabetes  Gout  Essential hypertension    Recommendations:   c/w telemetry   c/w ASA/Brilinta, metoprolol and lipitor  LHC in AM, keep NPO after midnight  TTE

## 2020-06-15 NOTE — PROGRESS NOTE ADULT - ASSESSMENT
54 y/o male patient with HTN, pre-diabetes, hiatal hernia, Gout presented for chest pain/sob.     # Recurrent chest pain and chest pain - unstable angina secondary to severe proximal LAD stenosis on CCTA   - Cardiology consulted, nuke stress test or cath as per malpeso - Pending official consult  - Continue Aspirin 81 mg, c/w Lipitor 80 mg daily and Metoprolol tartrate 50 mg BID  - Continue Telemetry monitoring   - Troponin negative x3    # HTN   - Continue Amlodipine 5 mg daily, history of non-compliance     # Hiatal hernia   - Protonix 40 mg daily     # History of gout   - Continue Allopurinol 100 mg daily       Activity: ambulate as tolerated  DVT : hep 5k tid  gi ppx: ppi  diet: DASH TLC diet  Full code

## 2020-06-15 NOTE — PROGRESS NOTE ADULT - SUBJECTIVE AND OBJECTIVE BOX
SUBJECTIVE:    Patient is a 55y old Male who presents with a chief complaint of Shortness of breath (14 Jun 2020 18:16)    Currently admitted to medicine with the primary diagnosis of Chest pain     Today is hospital day 1d. This morning he is resting comfortably in bed and reports no new issues or overnight events. States CP is resolved; denies any palpitations, SOB, lower extremity edema.     PAST MEDICAL & SURGICAL HISTORY  CAD (coronary artery disease)  Prediabetes  Chronic gout of foot, unspecified cause, unspecified laterality  Essential hypertension  Hiatal hernia  History of cardiac catheterization: x2 with total of 4 stents placed  History of tonsillectomy and adenoidectomy    SOCIAL HISTORY:  Negative for smoking/alcohol/drug use.     ALLERGIES:  No Known Allergies    MEDICATIONS:  STANDING MEDICATIONS  allopurinol 100 milliGRAM(s) Oral daily  aspirin enteric coated 81 milliGRAM(s) Oral daily  atorvastatin 80 milliGRAM(s) Oral at bedtime  chlorhexidine 4% Liquid 1 Application(s) Topical <User Schedule>  heparin   Injectable 5000 Unit(s) SubCutaneous every 8 hours  metoprolol tartrate 50 milliGRAM(s) Oral two times a day  ticagrelor 60 milliGRAM(s) Oral every 12 hours    PRN MEDICATIONS    VITALS:   T(F): 96.4  HR: 61 (59 - 89)  BP: 163/89 (145/99 - 189/116)  RR: 19 (17 - 19)  SpO2: 99% (97% - 99%)    LABS:                        15.0   9.14  )-----------( 171      ( 15 Abundio 2020 05:55 )             44.1     06-15    141  |  105  |  12  ----------------------------<  87  3.8   |  25  |  1.0    Ca    8.6      15 Abundio 2020 05:55  Phos  3.1     06-15  Mg     2.1     06-15    TPro  6.6  /  Alb  4.2  /  TBili  0.9  /  DBili  x   /  AST  13  /  ALT  16  /  AlkPhos  83  06-14    PT/INR - ( 14 Jun 2020 16:31 )   PT: 11.90 sec;   INR: 1.03 ratio         PTT - ( 14 Jun 2020 16:31 )  PTT:32.0 sec      Troponin T, Serum: <0.01 ng/mL (06-15-20 @ 05:55)  Troponin T, Serum: <0.01 ng/mL (06-15-20 @ 00:20)  Troponin T, Serum: <0.01 ng/mL (06-14-20 @ 16:31)      CARDIAC MARKERS ( 15 Abundio 2020 05:55 )  x     / <0.01 ng/mL / x     / x     / x      CARDIAC MARKERS ( 15 Abundio 2020 00:20 )  x     / <0.01 ng/mL / x     / x     / x      CARDIAC MARKERS ( 14 Jun 2020 16:31 )  x     / <0.01 ng/mL / x     / x     / x        Troponin T, Serum: <0.01 ng/mL (06-15-20 @ 05:55)  Troponin T, Serum: <0.01 ng/mL (06-15-20 @ 00:20)  Troponin T, Serum: <0.01 ng/mL (06-14-20 @ 16:31)  Serum Pro-Brain Natriuretic Peptide: 639 pg/mL (06-14-20 @ 16:31)    PHYSICAL EXAM:  GEN: In no acute distress. Pt. is awake in bed able to have a conversation.  LUNGS: CTABL, Symmetrical inspiration, no increased work of breathing  HEART: +S1,S2, RRR, No murmurs, Rubs, Gallops   ABD: Bowel Sounds Present, Soft, non tender, non distended, no guarding, no rebound.   EXT: 2+ peripheral Pulses, no clubbing, no cyanosis, no Edema.   NEURO: AAOX3. No focal deficits. CN 2-12 Grossly intact.

## 2020-06-15 NOTE — PROGRESS NOTE ADULT - SUBJECTIVE AND OBJECTIVE BOX
SO ANN  55y  Male      Patient is a 55y old  Male who presents with a chief complaint of Shortness of breath (15 Abundio 2020 13:25)      INTERVAL HPI/OVERNIGHT EVENTS:      ******************************* REVIEW OF SYSTEMS:**********************************************    resting in bed.   All other review of systems negative    *********************** VITALS ******************************************    T(F): 97.1 (06-15-20 @ 12:29)  HR: 67 (06-15-20 @ 12:29) (59 - 70)  BP: 144/80 (06-15-20 @ 12:29) (144/80 - 189/116)  RR: 18 (06-15-20 @ 12:29) (18 - 19)  SpO2: 99% (06-14-20 @ 21:05) (99% - 99%)            ******************************** PHYSICAL EXAM:**************************************************  GENERAL: NAD    PSYCH: no agitation, baseline mentation  HEENT:     NERVOUS SYSTEM:  Alert & Oriented X3,     PULMONARY: SALBADOR, CTA    CARDIOVASCULAR: S1S2 RRR    GI: Soft, NT, ND; BS present.    EXTREMITIES:  2+ Peripheral Pulses, No clubbing, cyanosis, or edema    LYMPH: No lymphadenopathy noted    SKIN: No rashes or lesions    ******************************************************************************************      **************************** LABS *******************************************************                          15.0   9.14  )-----------( 171      ( 15 Abundio 2020 05:55 )             44.1     06-15    141  |  105  |  12  ----------------------------<  87  3.8   |  25  |  1.0    Ca    8.6      15 Abundio 2020 05:55  Phos  3.1     06-15  Mg     2.1     06-15    TPro  6.6  /  Alb  4.2  /  TBili  0.9  /  DBili  x   /  AST  13  /  ALT  16  /  AlkPhos  83  06-14        PT/INR - ( 14 Jun 2020 16:31 )   PT: 11.90 sec;   INR: 1.03 ratio         PTT - ( 14 Jun 2020 16:31 )  PTT:32.0 sec  Lactate Trend    CARDIAC MARKERS ( 15 Abundio 2020 05:55 )  x     / <0.01 ng/mL / x     / x     / x      CARDIAC MARKERS ( 15 Abundio 2020 00:20 )  x     / <0.01 ng/mL / x     / x     / x      CARDIAC MARKERS ( 14 Jun 2020 16:31 )  x     / <0.01 ng/mL / x     / x     / x          CAPILLARY BLOOD GLUCOSE              **************************Active Medications *******************************************  No Known Allergies      allopurinol 100 milliGRAM(s) Oral daily  aspirin enteric coated 81 milliGRAM(s) Oral daily  atorvastatin 80 milliGRAM(s) Oral at bedtime  chlorhexidine 4% Liquid 1 Application(s) Topical <User Schedule>  heparin   Injectable 5000 Unit(s) SubCutaneous every 8 hours  metoprolol tartrate 50 milliGRAM(s) Oral two times a day  ticagrelor 60 milliGRAM(s) Oral every 12 hours      ***************************************************  RADIOLOGY & ADDITIONAL TESTS:    Imaging Personally Reviewed:  [ ] YES  [ ] NO    HEALTH ISSUES - PROBLEM Dx:

## 2020-06-16 LAB
ANION GAP SERPL CALC-SCNC: 11 MMOL/L — SIGNIFICANT CHANGE UP (ref 7–14)
ANION GAP SERPL CALC-SCNC: 15 MMOL/L — HIGH (ref 7–14)
BUN SERPL-MCNC: 12 MG/DL — SIGNIFICANT CHANGE UP (ref 10–20)
BUN SERPL-MCNC: 14 MG/DL — SIGNIFICANT CHANGE UP (ref 10–20)
CALCIUM SERPL-MCNC: 8.6 MG/DL — SIGNIFICANT CHANGE UP (ref 8.5–10.1)
CALCIUM SERPL-MCNC: 8.9 MG/DL — SIGNIFICANT CHANGE UP (ref 8.5–10.1)
CHLORIDE SERPL-SCNC: 104 MMOL/L — SIGNIFICANT CHANGE UP (ref 98–110)
CHLORIDE SERPL-SCNC: 104 MMOL/L — SIGNIFICANT CHANGE UP (ref 98–110)
CO2 SERPL-SCNC: 23 MMOL/L — SIGNIFICANT CHANGE UP (ref 17–32)
CO2 SERPL-SCNC: 23 MMOL/L — SIGNIFICANT CHANGE UP (ref 17–32)
CREAT SERPL-MCNC: 1 MG/DL — SIGNIFICANT CHANGE UP (ref 0.7–1.5)
CREAT SERPL-MCNC: 1 MG/DL — SIGNIFICANT CHANGE UP (ref 0.7–1.5)
GLUCOSE SERPL-MCNC: 90 MG/DL — SIGNIFICANT CHANGE UP (ref 70–99)
GLUCOSE SERPL-MCNC: 98 MG/DL — SIGNIFICANT CHANGE UP (ref 70–99)
HCT VFR BLD CALC: 45.3 % — SIGNIFICANT CHANGE UP (ref 42–52)
HCT VFR BLD CALC: 45.9 % — SIGNIFICANT CHANGE UP (ref 42–52)
HGB BLD-MCNC: 15.4 G/DL — SIGNIFICANT CHANGE UP (ref 14–18)
HGB BLD-MCNC: 15.5 G/DL — SIGNIFICANT CHANGE UP (ref 14–18)
MAGNESIUM SERPL-MCNC: 2.1 MG/DL — SIGNIFICANT CHANGE UP (ref 1.8–2.4)
MCHC RBC-ENTMCNC: 28.4 PG — SIGNIFICANT CHANGE UP (ref 27–31)
MCHC RBC-ENTMCNC: 28.5 PG — SIGNIFICANT CHANGE UP (ref 27–31)
MCHC RBC-ENTMCNC: 33.8 G/DL — SIGNIFICANT CHANGE UP (ref 32–37)
MCHC RBC-ENTMCNC: 34 G/DL — SIGNIFICANT CHANGE UP (ref 32–37)
MCV RBC AUTO: 83.6 FL — SIGNIFICANT CHANGE UP (ref 80–94)
MCV RBC AUTO: 84.5 FL — SIGNIFICANT CHANGE UP (ref 80–94)
NRBC # BLD: 0 /100 WBCS — SIGNIFICANT CHANGE UP (ref 0–0)
NRBC # BLD: 0 /100 WBCS — SIGNIFICANT CHANGE UP (ref 0–0)
PLATELET # BLD AUTO: 191 K/UL — SIGNIFICANT CHANGE UP (ref 130–400)
PLATELET # BLD AUTO: 206 K/UL — SIGNIFICANT CHANGE UP (ref 130–400)
POTASSIUM SERPL-MCNC: 3.4 MMOL/L — LOW (ref 3.5–5)
POTASSIUM SERPL-MCNC: 3.5 MMOL/L — SIGNIFICANT CHANGE UP (ref 3.5–5)
POTASSIUM SERPL-SCNC: 3.4 MMOL/L — LOW (ref 3.5–5)
POTASSIUM SERPL-SCNC: 3.5 MMOL/L — SIGNIFICANT CHANGE UP (ref 3.5–5)
RBC # BLD: 5.42 M/UL — SIGNIFICANT CHANGE UP (ref 4.7–6.1)
RBC # BLD: 5.43 M/UL — SIGNIFICANT CHANGE UP (ref 4.7–6.1)
RBC # FLD: 14 % — SIGNIFICANT CHANGE UP (ref 11.5–14.5)
RBC # FLD: 14.1 % — SIGNIFICANT CHANGE UP (ref 11.5–14.5)
SODIUM SERPL-SCNC: 138 MMOL/L — SIGNIFICANT CHANGE UP (ref 135–146)
SODIUM SERPL-SCNC: 142 MMOL/L — SIGNIFICANT CHANGE UP (ref 135–146)
WBC # BLD: 8.95 K/UL — SIGNIFICANT CHANGE UP (ref 4.8–10.8)
WBC # BLD: 9.14 K/UL — SIGNIFICANT CHANGE UP (ref 4.8–10.8)
WBC # FLD AUTO: 8.95 K/UL — SIGNIFICANT CHANGE UP (ref 4.8–10.8)
WBC # FLD AUTO: 9.14 K/UL — SIGNIFICANT CHANGE UP (ref 4.8–10.8)

## 2020-06-16 PROCEDURE — 99232 SBSQ HOSP IP/OBS MODERATE 35: CPT

## 2020-06-16 PROCEDURE — 93458 L HRT ARTERY/VENTRICLE ANGIO: CPT | Mod: 26,59

## 2020-06-16 PROCEDURE — 92928 PRQ TCAT PLMT NTRAC ST 1 LES: CPT | Mod: LD

## 2020-06-16 PROCEDURE — 92921: CPT | Mod: NC,LD

## 2020-06-16 PROCEDURE — 93010 ELECTROCARDIOGRAM REPORT: CPT

## 2020-06-16 RX ORDER — TICAGRELOR 90 MG/1
180 TABLET ORAL ONCE
Refills: 0 | Status: COMPLETED | OUTPATIENT
Start: 2020-06-16 | End: 2020-06-16

## 2020-06-16 RX ORDER — TICAGRELOR 90 MG/1
1 TABLET ORAL
Qty: 0 | Refills: 0 | DISCHARGE

## 2020-06-16 RX ORDER — SODIUM CHLORIDE 9 MG/ML
1000 INJECTION INTRAMUSCULAR; INTRAVENOUS; SUBCUTANEOUS
Refills: 0 | Status: DISCONTINUED | OUTPATIENT
Start: 2020-06-16 | End: 2020-06-17

## 2020-06-16 RX ORDER — TICAGRELOR 90 MG/1
90 TABLET ORAL
Refills: 0 | Status: DISCONTINUED | OUTPATIENT
Start: 2020-06-16 | End: 2020-06-16

## 2020-06-16 RX ORDER — TICAGRELOR 90 MG/1
90 TABLET ORAL
Refills: 0 | Status: DISCONTINUED | OUTPATIENT
Start: 2020-06-17 | End: 2020-06-17

## 2020-06-16 RX ORDER — TICAGRELOR 90 MG/1
0 TABLET ORAL
Qty: 0 | Refills: 0 | DISCHARGE

## 2020-06-16 RX ORDER — POTASSIUM CHLORIDE 20 MEQ
40 PACKET (EA) ORAL ONCE
Refills: 0 | Status: COMPLETED | OUTPATIENT
Start: 2020-06-16 | End: 2020-06-16

## 2020-06-16 RX ORDER — TICAGRELOR 90 MG/1
1 TABLET ORAL
Qty: 60 | Refills: 0
Start: 2020-06-16 | End: 2020-07-15

## 2020-06-16 RX ADMIN — HEPARIN SODIUM 5000 UNIT(S): 5000 INJECTION INTRAVENOUS; SUBCUTANEOUS at 22:26

## 2020-06-16 RX ADMIN — Medication 100 MILLIGRAM(S): at 11:24

## 2020-06-16 RX ADMIN — HEPARIN SODIUM 5000 UNIT(S): 5000 INJECTION INTRAVENOUS; SUBCUTANEOUS at 05:37

## 2020-06-16 RX ADMIN — ATORVASTATIN CALCIUM 80 MILLIGRAM(S): 80 TABLET, FILM COATED ORAL at 22:26

## 2020-06-16 RX ADMIN — Medication 50 MILLIGRAM(S): at 05:37

## 2020-06-16 RX ADMIN — TICAGRELOR 180 MILLIGRAM(S): 90 TABLET ORAL at 16:13

## 2020-06-16 RX ADMIN — Medication 40 MILLIEQUIVALENT(S): at 11:24

## 2020-06-16 RX ADMIN — Medication 50 MILLIGRAM(S): at 17:32

## 2020-06-16 RX ADMIN — CHLORHEXIDINE GLUCONATE 1 APPLICATION(S): 213 SOLUTION TOPICAL at 05:33

## 2020-06-16 RX ADMIN — TICAGRELOR 60 MILLIGRAM(S): 90 TABLET ORAL at 05:37

## 2020-06-16 RX ADMIN — Medication 81 MILLIGRAM(S): at 11:24

## 2020-06-16 RX ADMIN — SODIUM CHLORIDE 150 MILLILITER(S): 9 INJECTION INTRAMUSCULAR; INTRAVENOUS; SUBCUTANEOUS at 22:24

## 2020-06-16 NOTE — PROGRESS NOTE ADULT - SUBJECTIVE AND OBJECTIVE BOX
SOANN  55y  Male      Patient is a 55y old  Male who presents with a chief complaint of Shortness of breath on admission.   The patient was seen and examined at bedside.  Feeling better.     INTERVAL HPI/OVERNIGHT EVENTS: none       ******************************* REVIEW OF SYSTEMS:**********************************************    resting in bed. No distress.   All other review of systems negative    *********************** VITALS ******************************************    T(F): 96.6 (06-16-20 @ 05:28)  HR: 69 (06-16-20 @ 05:28) (68 - 69)  BP: 148/71 (06-16-20 @ 05:28) (141/78 - 148/71)  RR: 18 (06-16-20 @ 05:28) (18 - 18)  SpO2: --    06-15-20 @ 07:01  -  06-16-20 @ 07:00  --------------------------------------------------------  IN: 410 mL / OUT: 550 mL / NET: -140 mL            06-15-20 @ 07:01  -  06-16-20 @ 07:00  --------------------------------------------------------  IN: 410 mL / OUT: 550 mL / NET: -140 mL        ******************************** PHYSICAL EXAM:**************************************************  GENERAL: NAD    PSYCH: baseline mentation  HEENT:     NERVOUS SYSTEM:  Alert & Oriented X3,     PULMONARY: SALBADOR, CTA    CARDIOVASCULAR: S1S2 RRR    GI: Soft, NT, ND; BS present.    EXTREMITIES:  2+ Peripheral Pulses, No clubbing, cyanosis, or edema    LYMPH: No lymphadenopathy noted    SKIN: No rashes or lesions      **************************** LABS *******************************************************                          15.5   8.95  )-----------( 206      ( 16 Jun 2020 05:51 )             45.9     06-16    142  |  104  |  12  ----------------------------<  90  3.4<L>   |  23  |  1.0    Ca    8.9      16 Jun 2020 05:51  Phos  3.1     06-15  Mg     2.1     06-16            Lactate Trend    CARDIAC MARKERS ( 15 Abundio 2020 05:55 )  x     / <0.01 ng/mL / x     / x     / x      CARDIAC MARKERS ( 15 Abundio 2020 00:20 )  x     / <0.01 ng/mL / x     / x     / x          CAPILLARY BLOOD GLUCOSE              **************************Active Medications *******************************************  No Known Allergies      allopurinol 100 milliGRAM(s) Oral daily  aspirin enteric coated 81 milliGRAM(s) Oral daily  atorvastatin 80 milliGRAM(s) Oral at bedtime  chlorhexidine 4% Liquid 1 Application(s) Topical <User Schedule>  heparin   Injectable 5000 Unit(s) SubCutaneous every 8 hours  metoprolol tartrate 50 milliGRAM(s) Oral two times a day  sodium chloride 0.9%. 1000 milliLiter(s) IV Continuous <Continuous>      ***************************************************  RADIOLOGY & ADDITIONAL TESTS:    Imaging Personally Reviewed:  [ ] YES  [ ] NO    HEALTH ISSUES - PROBLEM Dx:

## 2020-06-16 NOTE — CHART NOTE - NSCHARTNOTEFT_GEN_A_CORE
PRE-OP DIAGNOSIS: Unstable angina    PROCEDURE: [facility] with coronary angiography    Physician: Dr. Mendiola  Assistant: René Rojas    ANESTHESIA TYPE:  [  ] General Anesthesia  [x] Sedation  [x] Local/Regional    ESTIMATED BLOOD LOSS:     10 mL    CONDITION  [  ] Critical  [  ] Serious  [x] Fair  [  ] Good      SPECIMENS REMOVED (IF APPLICABLE): N/A      IV CONTRAST:   175  mL      IMPLANTS (IF APPLICABLE)      FINDINGS    Left Heart Catheterization:  LVEF%: 55  LVEDP: normal    LEFT HEART CATHETERIZATION                                    Left Main: normal    LAD: prox LAD 90% tubular stenosis, mid LAD prior stent patent, distal LAD luminal irregularities                        Diag: D1 90% ostial stenosis proximal to prior stent, prior stent patent proximal third    Left Circumflex: small to medium sized vessel, prox Circ mild disease, distal Circ no disease    Right Coronary Artery: mild diffuse disease  RPDA: moderate diffuse disease      Ramus Intermed: moderate disease in proximal third, no disease in th rest of the vessel, spits to 2 vessels    DOMINANCE: Right    ACCESS: Right radial artery  CLOSURE: D-stat    INTERVENTION: PCI+stent to mid-prox LAD after D1, PCI to significant ostial D1 lesion  IMPLANTS: Synergy RACHAEL 3.0x16 mm to mid-prox LAD after D1      POST-OP DIAGNOSIS  Significant 1V disease    PLAN OF CARE  [x] Return to In-patient bed  [x] Continue DAPT, B-blocker & Statin therapy PRE-OP DIAGNOSIS: Unstable angina    PROCEDURE: [facility] with coronary angiography    Physician: Dr. Mendiola  Assistant: René Rojas    ANESTHESIA TYPE:  [  ] General Anesthesia  [x] Sedation  [x] Local/Regional    ESTIMATED BLOOD LOSS:     10 mL    CONDITION  [  ] Critical  [  ] Serious  [x] Fair  [  ] Good      SPECIMENS REMOVED (IF APPLICABLE): N/A      IV CONTRAST:   175  mL      IMPLANTS (IF APPLICABLE)      FINDINGS    Left Heart Catheterization:  LVEF%: 55  LVEDP: normal    LEFT HEART CATHETERIZATION                                    Left Main: normal    LAD: prox LAD 90% tubular stenosis, mid LAD prior stent patent, distal LAD luminal irregularities                        Diag: D1 90% ostial stenosis proximal to prior stent, prior stent patent proximal third    Left Circumflex: small to medium sized vessel, prox Circ mild disease, distal Circ no disease    Right Coronary Artery: mild diffuse disease  RPDA: severe disease distally      Ramus Intermed: moderate disease in proximal third, no disease in th rest of the vessel, spits to 2 vessels    DOMINANCE: Right    ACCESS: Right radial artery  CLOSURE: D-stat    INTERVENTION: PCI+stent to mid-prox LAD after D1, PCI to significant ostial D1 lesion  IMPLANTS: Synergy RACHAEL 3.0x16 mm to mid-prox LAD after D1      POST-OP DIAGNOSIS  Significant 1V disease    PLAN OF CARE  [x] Return to In-patient bed  [x] Continue DAPT, B-blocker & Statin therapy

## 2020-06-16 NOTE — CHART NOTE - NSCHARTNOTEFT_GEN_A_CORE
Brilinta 90 mg PO q12hr x 1 month - prescription sent to [facility]  medication price is 3$ per month, patient is agreeing for it   medication is ready for a

## 2020-06-16 NOTE — PROGRESS NOTE ADULT - ASSESSMENT
56 y/o male patient with HTN, pre-diabetes, hiatal hernia, Gout presented for chest pain/sob.     # Recurrent chest pain and chest pain - unstable angina secondary to severe proximal LAD stenosis on CCTA   - Cardiology eval noted, appreciated.    s/p  cardiac cath today >> Severe 1 vessel dz >> s/p PCI   - Continue Aspirin 81 mg, c/w Lipitor 80 mg daily and Metoprolol tartrate 50 mg BID    Added Ticagrelor / Brilinta     - Continue Telemetry monitoring x 24 more hours.      # HTN   - Continue Amlodipine 5 mg daily, history of non-compliance     # Hiatal hernia   - Protonix 40 mg daily     # History of gout   - Continue Allopurinol 100 mg daily       Activity: ambulate as tolerated  DVT : hep 5k tid  gi ppx: ppi  diet: DASH TLC diet  Full code    #Progress Note Handoff  Pending (specify): Clinical stability     Disposition: Home_______

## 2020-06-16 NOTE — PROGRESS NOTE ADULT - SUBJECTIVE AND OBJECTIVE BOX
SUBJECTIVE:    Patient is a 55y old Male who presents with a chief complaint of Shortness of breath (15 Abundio 2020 17:00)    Currently admitted to medicine with the primary diagnosis of Chest pain     Today is hospital day 2d. This morning he is resting comfortably in bed and reports no new issues or overnight events. Denies any CP, palpitations, SOB, LE edema.     PAST MEDICAL & SURGICAL HISTORY  CAD (coronary artery disease)  Prediabetes  Chronic gout of foot, unspecified cause, unspecified laterality  Essential hypertension  Hiatal hernia  History of cardiac catheterization: x2 with total of 4 stents placed  History of tonsillectomy and adenoidectomy    SOCIAL HISTORY:  Negative for smoking/alcohol/drug use.     ALLERGIES:  No Known Allergies    MEDICATIONS:  STANDING MEDICATIONS  allopurinol 100 milliGRAM(s) Oral daily  aspirin enteric coated 81 milliGRAM(s) Oral daily  atorvastatin 80 milliGRAM(s) Oral at bedtime  chlorhexidine 4% Liquid 1 Application(s) Topical <User Schedule>  heparin   Injectable 5000 Unit(s) SubCutaneous every 8 hours  metoprolol tartrate 50 milliGRAM(s) Oral two times a day  ticagrelor 60 milliGRAM(s) Oral every 12 hours    PRN MEDICATIONS    VITALS:   T(F): 96.6  HR: 69 (68 - 69)  BP: 148/71 (141/78 - 148/71)  RR: 18 (18 - 18)  SpO2: --    LABS:                        15.5   8.95  )-----------( 206      ( 16 Jun 2020 05:51 )             45.9     06-16    142  |  104  |  12  ----------------------------<  90  3.4<L>   |  23  |  1.0    Ca    8.9      16 Jun 2020 05:51  Phos  3.1     06-15  Mg     2.1     06-16    TPro  6.6  /  Alb  4.2  /  TBili  0.9  /  DBili  x   /  AST  13  /  ALT  16  /  AlkPhos  83  06-14    PT/INR - ( 14 Jun 2020 16:31 )   PT: 11.90 sec;   INR: 1.03 ratio       PTT - ( 14 Jun 2020 16:31 )  PTT:32.0 sec    CARDIAC MARKERS ( 15 Abundio 2020 05:55 )  x     / <0.01 ng/mL / x     / x     / x      CARDIAC MARKERS ( 15 Abundio 2020 00:20 )  x     / <0.01 ng/mL / x     / x     / x      CARDIAC MARKERS ( 14 Jun 2020 16:31 )  x     / <0.01 ng/mL / x     / x     / x        Troponin T, Serum: <0.01 ng/mL (06-15-20 @ 05:55)  Troponin T, Serum: <0.01 ng/mL (06-15-20 @ 00:20)  Troponin T, Serum: <0.01 ng/mL (06-14-20 @ 16:31)  Serum Pro-Brain Natriuretic Peptide: 639 pg/mL (06-14-20 @ 16:31)    PHYSICAL EXAM:  GEN: In no acute distress. Pt. is awake in bed able to have a conversation.  LUNGS: CTABL, Symmetrical inspiration, no increased work of breathing  HEART: +S1,S2, RRR, No murmurs, Rubs, Gallops   ABD: Bowel Sounds Present, Soft, non tender, non distended, no guarding, no rebound.   EXT: 2+ peripheral Pulses, no clubbing, no cyanosis, no Edema.   NEURO: AAOX3. No focal deficits.

## 2020-06-16 NOTE — PROGRESS NOTE ADULT - ASSESSMENT
56 y/o male patient with HTN, pre-diabetes, hiatal hernia, Gout presented for chest pain/sob.     # Recurrent chest pain and chest pain - unstable angina secondary to severe proximal LAD stenosis on CCTA   - Cardiology on board  - Continue Aspirin 81 mg, c/w Lipitor 80 mg daily and Metoprolol tartrate 50 mg BID  - Continue Telemetry monitoring   - Troponin negative x3  - Echo: 1. LV Ejection Fraction by Mcelroy's Method with a biplane EF of 63%. 2. Normal left ventricular size and wall thicknesses, with normal systolic and diastolic function  - Cardiac cath 6/16 - FU results     # HTN   - Continue Amlodipine 5 mg daily, history of non-compliance     # Hiatal hernia   - Protonix 40 mg daily     # History of gout   - Continue Allopurinol 100 mg daily     Activity: ambulate as tolerated  DVT : hep 5k tid  gi ppx: ppi  diet: DASH TLC diet  Full code

## 2020-06-17 ENCOUNTER — TRANSCRIPTION ENCOUNTER (OUTPATIENT)
Age: 55
End: 2020-06-17

## 2020-06-17 VITALS — SYSTOLIC BLOOD PRESSURE: 150 MMHG | DIASTOLIC BLOOD PRESSURE: 89 MMHG

## 2020-06-17 LAB
ANION GAP SERPL CALC-SCNC: 13 MMOL/L — SIGNIFICANT CHANGE UP (ref 7–14)
BUN SERPL-MCNC: 14 MG/DL — SIGNIFICANT CHANGE UP (ref 10–20)
CALCIUM SERPL-MCNC: 8.9 MG/DL — SIGNIFICANT CHANGE UP (ref 8.5–10.1)
CHLORIDE SERPL-SCNC: 105 MMOL/L — SIGNIFICANT CHANGE UP (ref 98–110)
CO2 SERPL-SCNC: 23 MMOL/L — SIGNIFICANT CHANGE UP (ref 17–32)
CREAT SERPL-MCNC: 1.1 MG/DL — SIGNIFICANT CHANGE UP (ref 0.7–1.5)
GLUCOSE SERPL-MCNC: 90 MG/DL — SIGNIFICANT CHANGE UP (ref 70–99)
HCT VFR BLD CALC: 44.5 % — SIGNIFICANT CHANGE UP (ref 42–52)
HGB BLD-MCNC: 15.3 G/DL — SIGNIFICANT CHANGE UP (ref 14–18)
MAGNESIUM SERPL-MCNC: 2 MG/DL — SIGNIFICANT CHANGE UP (ref 1.8–2.4)
MCHC RBC-ENTMCNC: 29 PG — SIGNIFICANT CHANGE UP (ref 27–31)
MCHC RBC-ENTMCNC: 34.4 G/DL — SIGNIFICANT CHANGE UP (ref 32–37)
MCV RBC AUTO: 84.4 FL — SIGNIFICANT CHANGE UP (ref 80–94)
NRBC # BLD: 0 /100 WBCS — SIGNIFICANT CHANGE UP (ref 0–0)
PLATELET # BLD AUTO: 196 K/UL — SIGNIFICANT CHANGE UP (ref 130–400)
POTASSIUM SERPL-MCNC: 3.6 MMOL/L — SIGNIFICANT CHANGE UP (ref 3.5–5)
POTASSIUM SERPL-SCNC: 3.6 MMOL/L — SIGNIFICANT CHANGE UP (ref 3.5–5)
RBC # BLD: 5.27 M/UL — SIGNIFICANT CHANGE UP (ref 4.7–6.1)
RBC # FLD: 14.3 % — SIGNIFICANT CHANGE UP (ref 11.5–14.5)
SODIUM SERPL-SCNC: 141 MMOL/L — SIGNIFICANT CHANGE UP (ref 135–146)
WBC # BLD: 10.83 K/UL — HIGH (ref 4.8–10.8)
WBC # FLD AUTO: 10.83 K/UL — HIGH (ref 4.8–10.8)

## 2020-06-17 PROCEDURE — 99238 HOSP IP/OBS DSCHRG MGMT 30/<: CPT

## 2020-06-17 PROCEDURE — 93010 ELECTROCARDIOGRAM REPORT: CPT

## 2020-06-17 RX ADMIN — Medication 100 MILLIGRAM(S): at 12:10

## 2020-06-17 RX ADMIN — HEPARIN SODIUM 5000 UNIT(S): 5000 INJECTION INTRAVENOUS; SUBCUTANEOUS at 05:29

## 2020-06-17 RX ADMIN — Medication 50 MILLIGRAM(S): at 05:30

## 2020-06-17 RX ADMIN — Medication 81 MILLIGRAM(S): at 12:10

## 2020-06-17 RX ADMIN — TICAGRELOR 90 MILLIGRAM(S): 90 TABLET ORAL at 05:30

## 2020-06-17 NOTE — DISCHARGE NOTE PROVIDER - CARE PROVIDER_API CALL
Ben Mendiola  Cardiology  [address] [place] [id number]  Phone: [phone]  Fax: [phone]  Follow Up Time: 1 week

## 2020-06-17 NOTE — DISCHARGE NOTE PROVIDER - HOSPITAL COURSE
56 y/o male pmh CAD s/p stents, UC, HTN, Gout BIBA from [facility] presented for chest pain. Chest pain is described as mid-sternal chest tightness/ pain which is non-radiating started while at rest this am. Patient explains pain is tolerable  Last week patient describes having SOB which was exertional.  It has not progressed but has been stable.  Patient explains he was here in October and had a left heart cath with 2 stents placed PCI to Mid LAD SYNERGY 3 x 32 and PCI to Diag SYNERGY 2.5 x 32 and patient was at that time started on dapt.  Patient cardiologist dr dos santos aware patient is here for the above symptoms.         Patient went for cardiac cath on 6/16 and had PCI to mid prox LAD. He tolerated the procedure well. He denied any CP, palpitations, shortness of breath. He is stable for discharge home with outpatient follow up.

## 2020-06-17 NOTE — DISCHARGE NOTE NURSING/CASE MANAGEMENT/SOCIAL WORK - PATIENT PORTAL LINK FT
You can access the FollowMyHealth Patient Portal offered by [facility] by registering at the following website: [email or web address]. By joining [facility]’s FollowMyHealth portal, you will also be able to view your health information using other applications (apps) compatible with our system.

## 2020-06-17 NOTE — DISCHARGE NOTE PROVIDER - NSDCCPCAREPLAN_GEN_ALL_CORE_FT
PRINCIPAL DISCHARGE DIAGNOSIS  Diagnosis: Chest pain  Assessment and Plan of Treatment: You came for chest pain and a cardiac cath was performed. You were found to have significant vessel disease in an artery in your heart, and a stent was placed to open up the blood vessel in the heart that was narrowed. You will continue to take antiplatelet medications and follow up outpatient with cardiology.

## 2020-06-17 NOTE — DISCHARGE NOTE PROVIDER - NSDCPNSUBOBJ_GEN_ALL_CORE
<<<RESIDENT DISCHARGE NOTE>>>         ANN RIZZO    MRN-[id number]        VITAL SIGNS:    T(F): 97.1 (06-17-20 @ 05:28), Max: 97.1 (06-17-20 @ 05:28)    HR: 78 (06-17-20 @ 05:28)    BP: 139/86 (06-17-20 @ 05:28)    SpO2: 98% (06-16-20 @ 20:30)            T(C): 36.2 (06-17-20 @ 05:28), Max: 36.2 (06-17-20 @ 05:28)    HR: 78 (06-17-20 @ 05:28) (63 - 78)    BP: 139/86 (06-17-20 @ 05:28) (139/86 - 163/93)    RR: 19 (06-17-20 @ 05:28) (18 - 19)    SpO2: 98% (06-16-20 @ 20:30) (98% - 98%)        GENERAL: NAD, well-developed, 55y    EENT: EOMI, conjunctiva and sclera clear, No nasal obstruction or discharge    RESPIRATORY: Clear to auscultation bilaterally; No wheeze or crackles    CARDIOVASCULAR: Regular rate and rhythm; No murmurs, rubs, or gallops, no pitting edema    GASTROINTESTINAL: Abdomen Soft, Nontender, Nondistended    MUSCULOSKELETAL:  No cyanosis, extremities grossly symmetrical    PSYCH: AAOx3, affect appropriate    NEUROLOGY: non-focal, cognition grossly intact, MAEE        TEST RESULTS:                            15.3     10.83 )-----------( 196      ( 17 Jun 2020 05:03 )               44.5             06-17        141  |  105  |  14    ----------------------------<  90    3.6   |  23  |  1.1        Ca    8.9      17 Jun 2020 05:03    Mg     2.0     06-17                FINAL DISCHARGE INTERVIEW:  Resident(s) Present: (Name:Red____________), RN Present: (Name:  ___________)        DISCHARGE MEDICATION RECONCILIATION  reviewed with Attending (Name:Jim Coburn__________)        DISPOSITION:   [ x ] Home,    [  ] Home with Visiting Nursing Services,   [    ]  SNF/ NH,    [   ] Acute Rehab (4A),   [   ] Other (Specify:_________)

## 2020-06-17 NOTE — DISCHARGE NOTE PROVIDER - NSDCFUADDINST_GEN_ALL_CORE_FT
If your symptoms return or persist, please call your doctor and return to the emergency department for further evaluation.

## 2020-06-17 NOTE — DISCHARGE NOTE NURSING/CASE MANAGEMENT/SOCIAL WORK - NSDCFUADDAPPT_GEN_ALL_CORE_FT
Please follow up with your cardiologist for further management of your conditions.     Please follow up with your primary care doctor to discuss medical management and hospitalization.

## 2020-06-17 NOTE — DISCHARGE NOTE PROVIDER - NSDCMRMEDTOKEN_GEN_ALL_CORE_FT
allopurinol 100 mg oral tablet: 1 tab(s) orally once a day  Aspirin Enteric Coated 81 mg oral delayed release tablet: 1 tab(s) orally once a day MDD:1  atorvastatin 80 mg oral tablet: 1 tab(s) orally once (at bedtime) MDD:1  Brilinta (ticagrelor) 90 mg oral tablet: 1 tab(s) orally 2 times a day MDD:2  Metoprolol Tartrate 50 mg oral tablet: 1 tab(s) orally 2 times a day MDD:2

## 2020-06-17 NOTE — DISCHARGE NOTE PROVIDER - NSDCFUADDAPPT_GEN_ALL_CORE_FT
Patient is with OT and is unable to be seen by Cardiac Rehabilitation at this time. Will follow to progress.     Please follow up with your cardiologist for further management of your conditions.     Please follow up with your primary care doctor to discuss medical management and hospitalization.

## 2020-06-17 NOTE — PROGRESS NOTE ADULT - SUBJECTIVE AND OBJECTIVE BOX
Cardiology Follow up    ANN RIZZO   55y Male  PAST MEDICAL & SURGICAL HISTORY:  CAD (coronary artery disease)  Prediabetes  Chronic gout of foot, unspecified cause, unspecified laterality  Essential hypertension  Hiatal hernia  History of cardiac catheterization: x2 with total of 4 stents placed  History of tonsillectomy and adenoidectomy       HPI:  56 y/o male pmh CAD s/p stents, UC, HTN, Gout BIBA from [facility] presented for chest pain. Chest pain is described as mid-sternal chest tightness/ pain which is non-radiating started while at rest this am. Patient explains pain is tolerable  Last week patient describes having SOB which was exertional.  It has not progressed but has been stable.  Patient explains he was here in October and had a left heart cath with 2 stents placed PCI to Mid LAD SYNERGY 3 x 32 and PCI to Diag SYNERGY 2.5 x 32 and patient was at that time started on dapt.  Patient cardiologist dr mendiola aware patient is here for the above symptoms.  Explains he wanted to admit for nuke stress test or possible cath.  No other complaints. (2020 18:16)    Allergies    No Known Allergies    Intolerances      Patient without complaints. Pt ambulated without issues/symptoms  Denies CP, SOB, palpitations, or dizziness  S.Jose on telemetry overnight    Vital Signs Last 24 Hrs  T(C): 36.2 (2020 05:28), Max: 36.2 (2020 05:28)  T(F): 97.1 (2020 05:28), Max: 97.1 (2020 05:28)  HR: 78 (2020 05:28) (63 - 78)  BP: 139/86 (2020 05:28) (139/86 - 163/93)  BP(mean): --  RR: 19 (2020 05:28) (18 - 19)  SpO2: 100% (2020 08:53) (98% - 100%)    MEDICATIONS  (STANDING):  allopurinol 100 milliGRAM(s) Oral daily  aspirin enteric coated 81 milliGRAM(s) Oral daily  atorvastatin 80 milliGRAM(s) Oral at bedtime  chlorhexidine 4% Liquid 1 Application(s) Topical <User Schedule>  heparin   Injectable 5000 Unit(s) SubCutaneous every 8 hours  metoprolol tartrate 50 milliGRAM(s) Oral two times a day  sodium chloride 0.9%. 1000 milliLiter(s) (150 mL/Hr) IV Continuous <Continuous>  ticagrelor 90 milliGRAM(s) Oral two times a day    MEDICATIONS  (PRN):      REVIEW OF SYSTEMS:          CONSTITUTIONAL: No weakness, fevers or chills          EYES/ENT: No visual changes;  No vertigo or throat pain           NECK: No pain or stiffness          RESPIRATORY: No cough, wheezing, hemoptysis          CARDIOVASCULAR: no pain, no DEMARCO, no palpitations           GASTROINTESTINAL: No abdominal or epigastric pain. No nausea, vomiting, or hematemesis;           GENITOURINARY: No dysuria, frequency or hematuria          NEUROLOGICAL: No numbness or weakness          SKIN: No itching, rashes    PHYSICAL EXAM:           CONSTITUTIONAL: Well-developed; well-nourished; in no acute distress  	SKIN: warm, dry  	HEAD: Normocephalic; atraumatic  	EYES: PERRL.  	ENT: No nasal discharge, airway clear, mucous membranes moist  	NECK: Supple; non tender.  	CARD: +S1, +S2, no murmurs, gallops, or rubs. (Regular) rate and rhythm    	RESP: No wheezes, rales or rhonchi. CTA B/L  	ABD: soft ntnd, + BS x 4 quadrants  	EXT: moves all extremities,  no clubbing, cyanosis or edema  	NEURO: Alert and oriented x3, no focal deficits          PSYCH: Cooperative, appropriate          VASCULAR:  + Rad / + PTs / + DPs          EXTREMITY:  	   Right Radial: Dressing removed, + pulses,  access site soft, no hematoma, no pain, no numbness, no signs and symptoms of infection             ECG:   Ventricular Rate 69 BPM    Atrial Rate 69 BPM    P-R Interval 192 ms    QRS Duration 98 ms    Q-T Interval 416 ms    QTC Calculation(Bezet) 445 ms    P Axis 28 degrees    R Axis 9 degrees    T Axis 28 degrees    Diagnosis Line Normal sinus rhythm  Septal infarct , age undetermined  Abnormal ECG    Confirmed by Reyes Kumari ([id number]) on 2020 8:38:36 AM                                                                                                                  2D ECHO:  EXAM:  2-D ECHO (TTE) COMPLETE        PROCEDURE DATE:  06/15/2020      INTERPRETATION:  REPORT:    TRANSTHORACIC ECHOCARDIOGRAM REPORT         Patient Name:   ANN RIZZO Accession #: [id number]  Medical Rec #:  [id number]       Height:      72.0in 182.9 cm  YOB: 1965       Weight:      247.0 lb 112.04 kg  Patient Age:    55 years        BSA:         2.33 m²  Patient Gender: M               BP:          144/80 mmHg       Date of Exam:        6/15/2020 1:53:21 PM  Referring Physician: [id number] ED UNASSIGNED  Sonographer:         Mary Moon  Reading Physician:   Jimbo Barrera MD.    Procedure:     2D Echo/Doppler/Color Doppler Complete.  Indications:   R07.9 - Chest Pain, unspecified  Diagnosis:     Chest pain, unspecified - R07.9  Study Details: Technically fair study.         Summary:   1. LV Ejection Fraction by Mcelroy's Method with a biplane EF of 63 %.   2. Normal left ventricular size and wall thicknesses, with normal systolic and diastolic function.   3. Theleft ventricular diastolic function could not be assessed in this study.   4. The mean global longitudinal peak strain by speckle tracking is -13.1% which is reduced.   5. Dilatation of the ascending aorta.   6. The ascending aorta is mildly dilated to a diameter of 4.0 cm.    PHYSICIAN INTERPRETATION:  Left Ventricle: Normal left ventricular size and wall thicknesses, with normal systolic and diastolic function. The left ventricular diastolic function could not be assessed in this study.  The mean global longitudinal peak strain by speckle tracking is -13.1% which is reduced.       LV Wall Scoring:  All segments are normal.    Right Ventricle: Normal right ventricular size and function.  Left Atrium: Normal left atrial size.  Right Atrium: Normal right atrial size.  Pericardium: There is no evidence of pericardial effusion.  Mitral Valve: Structurally normal mitral valve, with normal leaflet excursion. The mitral valve is normal in structure. No evidence of mitral valve regurgitation is seen.  Tricuspid Valve: Structurally normal tricuspid valve, with normal leaflet excursion. The tricuspid valve is normal in structure. Mild tricuspid regurgitation is visualized.  Aortic Valve: Normal trileaflet aortic valve with normal opening. The aortic valve is normal. Peak transaortic gradient equals 8.8 mmHg, mean transaortic gradient equals 5.0 mmHg, the calculated aortic valve area equals 3.81 cm² by the continuity equation consistent with normally opening aortic valve. No evidence of aortic valve regurgitation is seen.  Pulmonic Valve: Structurally normal pulmonic valve, with normal leaflet excursion. The pulmonic valve is normal. No indication of pulmonic valve regurgitation.  Aorta: There is dilatation of the ascending aorta. The ascending aorta is mildly dilated to a diameter of 4.0 cm.  Pulmonary Artery: The main pulmonary artery is normal in size.  Venous: The inferior vena cava was normal sized, with respiratory size variation less than 50%.       2D AND M-MODE MEASUREMENTS(normal ranges within parentheses):  Left Ventricle:                  Normal   Aorta/Left Atrium:             Normal  IVSd (2D):              1.33 cm (0.7-1.1) AoV Cusp Separation: 2.00 cm (1.5-2.6)  LVPWd (2D):             1.04 cm (0.7-1.1) Left Atrium (Mmode): 3.64 cm (1.9-4.0)  LVIDd (2D):             5.53 cm (3.4-5.7) Right Ventricle:  LVIDs (2D):             3.68 cm           RVd (2D):        2.52 cm  LV FS (2D):             33.4 %   (>25%)   TAPSE:           2.10 cm  Relative Wall Thickness 0.38    (<0.42)    SPECTRAL DOPPLER ANALYSIS:  LV DIASTOLIC FUNCTION:  MV Peak E: 0.54 m/s Decel Time: 331 msec  MV Peak A: 0.83 m/s  E/A Ratio: 0.65    Aortic Valve:  AoV VMax:    1.48 m/s AoV Area, Vmax: 2.93 cm² Vmax Indx: 1.26 cm²/m²  AoV VTI:  0.24 m   AoV Area, VTI:  3.81 cm² VTI Indx:  1.63 cm²/m²  AoV Pk Grad: 8.8 mmHg  AoV Mn Grad: 5.0 mmHg    LVOT Vmax: 1.22 m/s  LVOT VTI:  0.25 m  LVOT Diam: 2.13 cm    Mitral Valve:  MV P1/2 Time: 95.92 msec  MV Area, PHT: 2.29 cm²    Tricuspid Valve and PA/RV Systolic Pressure: TR Max Velocity: 2.22 m/s RA Pressure: 8 mmHg RVSP/PASP: 27.8 mmHg    Pulmonic Valve:  PV Max Velocity: 1.22 m/s PV Max P.0 mmHg PV Mean PG:       [id number] Jimbo Barrera MD, Electronically signed on 6/15/2020 at 4:16:54 PM              *** Final ***                    JIMBO BARRERA MD  This document has been electronically signed. Abundio 15 2020  1:53PM                  LABS:                        15.3   10.83 )-----------( 196      ( 2020 05:03 )             44.5     -    141  |  105  |  14  ----------------------------<  90  3.6   |  23  |  1.1    Ca    8.9      2020 05:03  Mg     2.0     -          Magnesium, Serum: 2.0 mg/dL [1.8 - 2.4] (20 @ 05:03)      A/P:  I discussed the case with Cardiologist Dr. Mendiola and recommend the following:    S/P PCI p/mLAD RACHAEL x 1  	     Continue DAPT (asa 81mg daily, brilinta 90mg q12),  B-Blocker, Statin Therapy                   no ACE, EF wnl                    Brilinta copay $3/month, pt aware and agreeable                   OOB-CH, ambulate                   monitor access site                   Patient agreeing to take DAPT for at least one year or as directed by cardiologist                    Pt given instructions on importance of taking antiplatelet medication or risk acute stent thrombosis/death                   Post cath instructions, access site care and activity restrictions reviewed with patient                     Discussed with patient to return to hospital if experience chest pain, shortness breath, dizziness and site bleeding                   Aggressive risk factor modification, diet counseling, smoking cessation discussed with patient                       Can discharge patient from cardiac standpoint as discussed with Dr. Mendiola                    Follow up with Cardiology Dr. Mendiola in 1-2 weeks.  Instructed to call and make an appointment

## 2020-06-19 DIAGNOSIS — I10 ESSENTIAL (PRIMARY) HYPERTENSION: ICD-10-CM

## 2020-06-19 DIAGNOSIS — Z79.82 LONG TERM (CURRENT) USE OF ASPIRIN: ICD-10-CM

## 2020-06-19 DIAGNOSIS — Z79.02 LONG TERM (CURRENT) USE OF ANTITHROMBOTICS/ANTIPLATELETS: ICD-10-CM

## 2020-06-19 DIAGNOSIS — I25.110 ATHEROSCLEROTIC HEART DISEASE OF NATIVE CORONARY ARTERY WITH UNSTABLE ANGINA PECTORIS: ICD-10-CM

## 2020-06-19 DIAGNOSIS — Z80.0 FAMILY HISTORY OF MALIGNANT NEOPLASM OF DIGESTIVE ORGANS: ICD-10-CM

## 2020-06-19 DIAGNOSIS — Z79.899 OTHER LONG TERM (CURRENT) DRUG THERAPY: ICD-10-CM

## 2020-06-19 DIAGNOSIS — M1A.9XX0 CHRONIC GOUT, UNSPECIFIED, WITHOUT TOPHUS (TOPHI): ICD-10-CM

## 2020-06-19 DIAGNOSIS — E78.5 HYPERLIPIDEMIA, UNSPECIFIED: ICD-10-CM

## 2020-06-19 DIAGNOSIS — Z98.890 OTHER SPECIFIED POSTPROCEDURAL STATES: ICD-10-CM

## 2020-06-19 DIAGNOSIS — R73.03 PREDIABETES: ICD-10-CM

## 2020-06-19 DIAGNOSIS — Z95.5 PRESENCE OF CORONARY ANGIOPLASTY IMPLANT AND GRAFT: ICD-10-CM

## 2021-07-24 PROBLEM — I25.10 ATHEROSCLEROTIC HEART DISEASE OF NATIVE CORONARY ARTERY WITHOUT ANGINA PECTORIS: Chronic | Status: ACTIVE | Noted: 2020-06-14

## 2021-07-27 NOTE — ED CDU PROVIDER INITIAL DAY NOTE - EYES, MLM
----- Message from Felice Aguirre MD sent at 7/27/2021  4:39 PM EDT -----  Order hepatitis virus profile     Dx elevated LFTs    Also have to talk to him
Clear bilaterally, pupils equal, round and reactive to light.